# Patient Record
Sex: FEMALE | Race: WHITE | Employment: UNEMPLOYED | URBAN - METROPOLITAN AREA
[De-identification: names, ages, dates, MRNs, and addresses within clinical notes are randomized per-mention and may not be internally consistent; named-entity substitution may affect disease eponyms.]

---

## 2021-01-11 PROBLEM — O20.0 THREATENED ABORTION: Status: ACTIVE | Noted: 2021-01-11

## 2021-01-11 LAB — TYPE, ABO & RH, EXTERNAL: NORMAL

## 2021-01-20 PROBLEM — Z34.01 PRIMIGRAVIDA, FIRST TRIMESTER: Status: ACTIVE | Noted: 2021-01-20

## 2021-01-20 LAB
HBSAG, EXTERNAL: NEGATIVE
RUBELLA, EXTERNAL: NORMAL

## 2021-02-23 ENCOUNTER — APPOINTMENT (OUTPATIENT)
Dept: ULTRASOUND IMAGING | Age: 31
End: 2021-02-23
Attending: PHYSICIAN ASSISTANT
Payer: COMMERCIAL

## 2021-02-23 ENCOUNTER — HOSPITAL ENCOUNTER (EMERGENCY)
Age: 31
Discharge: HOME | End: 2021-02-23
Attending: EMERGENCY MEDICINE
Payer: COMMERCIAL

## 2021-02-23 VITALS
HEART RATE: 95 BPM | RESPIRATION RATE: 16 BRPM | DIASTOLIC BLOOD PRESSURE: 62 MMHG | OXYGEN SATURATION: 98 % | SYSTOLIC BLOOD PRESSURE: 125 MMHG | TEMPERATURE: 98.1 F

## 2021-02-23 DIAGNOSIS — B96.89 BV (BACTERIAL VAGINOSIS): ICD-10-CM

## 2021-02-23 DIAGNOSIS — O20.0 THREATENED MISCARRIAGE: ICD-10-CM

## 2021-02-23 DIAGNOSIS — N76.0 BV (BACTERIAL VAGINOSIS): ICD-10-CM

## 2021-02-23 DIAGNOSIS — O20.9 VAGINAL BLEEDING AFFECTING EARLY PREGNANCY: Primary | ICD-10-CM

## 2021-02-23 LAB
ALBUMIN SERPL-MCNC: 3.8 G/DL (ref 3.5–5)
ALBUMIN/GLOB SERPL: 1 {RATIO} (ref 1.2–3.5)
ALP SERPL-CCNC: 50 U/L (ref 50–136)
ALT SERPL-CCNC: 30 U/L (ref 12–65)
ANION GAP SERPL CALC-SCNC: 7 MMOL/L (ref 7–16)
AST SERPL-CCNC: 12 U/L (ref 15–37)
BASOPHILS # BLD: 0 K/UL (ref 0–0.2)
BASOPHILS NFR BLD: 0 % (ref 0–2)
BILIRUB SERPL-MCNC: 0.4 MG/DL (ref 0.2–1.1)
BUN SERPL-MCNC: 5 MG/DL (ref 6–23)
CALCIUM SERPL-MCNC: 9.5 MG/DL (ref 8.3–10.4)
CHLORIDE SERPL-SCNC: 105 MMOL/L (ref 98–107)
CO2 SERPL-SCNC: 24 MMOL/L (ref 21–32)
CREAT SERPL-MCNC: 0.6 MG/DL (ref 0.6–1)
DIFFERENTIAL METHOD BLD: ABNORMAL
EOSINOPHIL # BLD: 0.1 K/UL (ref 0–0.8)
EOSINOPHIL NFR BLD: 1 % (ref 0.5–7.8)
ERYTHROCYTE [DISTWIDTH] IN BLOOD BY AUTOMATED COUNT: 11.6 % (ref 11.9–14.6)
GLOBULIN SER CALC-MCNC: 3.7 G/DL (ref 2.3–3.5)
GLUCOSE SERPL-MCNC: 83 MG/DL (ref 65–100)
HCG SERPL-ACNC: ABNORMAL MIU/ML (ref 0–6)
HCT VFR BLD AUTO: 38.4 % (ref 35.8–46.3)
HGB BLD-MCNC: 13.4 G/DL (ref 11.7–15.4)
IMM GRANULOCYTES # BLD AUTO: 0 K/UL (ref 0–0.5)
IMM GRANULOCYTES NFR BLD AUTO: 0 % (ref 0–5)
LYMPHOCYTES # BLD: 2.2 K/UL (ref 0.5–4.6)
LYMPHOCYTES NFR BLD: 19 % (ref 13–44)
MCH RBC QN AUTO: 31.8 PG (ref 26.1–32.9)
MCHC RBC AUTO-ENTMCNC: 34.9 G/DL (ref 31.4–35)
MCV RBC AUTO: 91.2 FL (ref 79.6–97.8)
MONOCYTES # BLD: 0.7 K/UL (ref 0.1–1.3)
MONOCYTES NFR BLD: 6 % (ref 4–12)
NEUTS SEG # BLD: 9.1 K/UL (ref 1.7–8.2)
NEUTS SEG NFR BLD: 75 % (ref 43–78)
NRBC # BLD: 0 K/UL (ref 0–0.2)
PLATELET # BLD AUTO: 258 K/UL (ref 150–450)
PMV BLD AUTO: 10.9 FL (ref 9.4–12.3)
POTASSIUM SERPL-SCNC: 3.6 MMOL/L (ref 3.5–5.1)
PROT SERPL-MCNC: 7.5 G/DL (ref 6.3–8.2)
RBC # BLD AUTO: 4.21 M/UL (ref 4.05–5.2)
SERVICE CMNT-IMP: NORMAL
SODIUM SERPL-SCNC: 136 MMOL/L (ref 136–145)
WBC # BLD AUTO: 12.1 K/UL (ref 4.3–11.1)
WET PREP GENITAL: NORMAL

## 2021-02-23 PROCEDURE — 93975 VASCULAR STUDY: CPT

## 2021-02-23 PROCEDURE — 74011250636 HC RX REV CODE- 250/636: Performed by: EMERGENCY MEDICINE

## 2021-02-23 PROCEDURE — 80053 COMPREHEN METABOLIC PANEL: CPT

## 2021-02-23 PROCEDURE — 74011250637 HC RX REV CODE- 250/637: Performed by: EMERGENCY MEDICINE

## 2021-02-23 PROCEDURE — 87491 CHLMYD TRACH DNA AMP PROBE: CPT

## 2021-02-23 PROCEDURE — 99284 EMERGENCY DEPT VISIT MOD MDM: CPT

## 2021-02-23 PROCEDURE — 84702 CHORIONIC GONADOTROPIN TEST: CPT

## 2021-02-23 PROCEDURE — 87210 SMEAR WET MOUNT SALINE/INK: CPT

## 2021-02-23 PROCEDURE — 81003 URINALYSIS AUTO W/O SCOPE: CPT

## 2021-02-23 PROCEDURE — 74011000250 HC RX REV CODE- 250: Performed by: EMERGENCY MEDICINE

## 2021-02-23 PROCEDURE — 85025 COMPLETE CBC W/AUTO DIFF WBC: CPT

## 2021-02-23 PROCEDURE — 96372 THER/PROPH/DIAG INJ SC/IM: CPT

## 2021-02-23 RX ORDER — AZITHROMYCIN 250 MG/1
1000 TABLET, FILM COATED ORAL
Status: COMPLETED | OUTPATIENT
Start: 2021-02-23 | End: 2021-02-23

## 2021-02-23 RX ORDER — METRONIDAZOLE 500 MG/1
500 TABLET ORAL 2 TIMES DAILY
Qty: 14 TAB | Refills: 0 | Status: SHIPPED | OUTPATIENT
Start: 2021-02-23 | End: 2021-03-02

## 2021-02-23 RX ADMIN — AZITHROMYCIN MONOHYDRATE 1000 MG: 250 TABLET ORAL at 20:17

## 2021-02-23 RX ADMIN — LIDOCAINE HYDROCHLORIDE 500 MG: 10 INJECTION, SOLUTION INFILTRATION; PERINEURAL at 20:18

## 2021-02-23 NOTE — ED PROVIDER NOTES
Patient is here with vaginal bleeding that started today. She is 11 weeks pregnant today. This is her first pregnancy. She did have a normal ultrasound that showed an IUP last week at the OB/GYN's. She felt like she did have some cramping today. She did have some nausea but she has been having nausea and constipation with her pregnancy. No fever, chest pain, shortness of breath, upper abdominal pain, dizziness, weakness, dyspnea on exertion, orthopnea, swelling/tingling or weakness to arms or legs or other new symptoms. She did ambulate to the room without difficulty and is well-hydrated. Blood type is A+. The history is provided by the patient. Vaginal Bleeding  This is a new problem. The current episode started 3 to 5 hours ago. The problem has been resolved. Pertinent negatives include no chest pain, no abdominal pain, no headaches and no shortness of breath. Nothing aggravates the symptoms. Nothing relieves the symptoms. She has tried nothing for the symptoms. No past medical history on file.     Past Surgical History:   Procedure Laterality Date    HX TONSILLECTOMY           Family History:   Problem Relation Age of Onset    Breast Cancer Neg Hx     Ovarian Cancer Neg Hx     Uterine Cancer Neg Hx     Colon Cancer Neg Hx        Social History     Socioeconomic History    Marital status:      Spouse name: Not on file    Number of children: Not on file    Years of education: Not on file    Highest education level: Not on file   Occupational History    Not on file   Social Needs    Financial resource strain: Not on file    Food insecurity     Worry: Not on file     Inability: Not on file    Transportation needs     Medical: Not on file     Non-medical: Not on file   Tobacco Use    Smoking status: Never Smoker    Smokeless tobacco: Never Used   Substance and Sexual Activity    Alcohol use: Not Currently    Drug use: Never    Sexual activity: Yes     Partners: Male     Birth control/protection: None   Lifestyle    Physical activity     Days per week: Not on file     Minutes per session: Not on file    Stress: Not on file   Relationships    Social connections     Talks on phone: Not on file     Gets together: Not on file     Attends Orthodoxy service: Not on file     Active member of club or organization: Not on file     Attends meetings of clubs or organizations: Not on file     Relationship status: Not on file    Intimate partner violence     Fear of current or ex partner: Not on file     Emotionally abused: Not on file     Physically abused: Not on file     Forced sexual activity: Not on file   Other Topics Concern    Not on file   Social History Narrative    Abuse: Feels safe at home, no history of physical abuse, no history of sexual abuse         ALLERGIES: Patient has no known allergies. Review of Systems   Constitutional: Negative. HENT: Negative. Eyes: Negative. Respiratory: Negative. Negative for shortness of breath. Cardiovascular: Negative. Negative for chest pain. Gastrointestinal: Negative. Negative for abdominal pain. Genitourinary: Positive for vaginal bleeding. Musculoskeletal: Negative. Skin: Negative. Neurological: Negative. Negative for headaches. Psychiatric/Behavioral: Negative. All other systems reviewed and are negative. Vitals:    02/23/21 1729   BP: 135/75   Pulse: 97   Resp: 18   Temp: 98.1 °F (36.7 °C)   SpO2: 97%            Physical Exam  Vitals signs and nursing note reviewed. Exam conducted with a chaperone present. Constitutional:       Appearance: She is well-developed. HENT:      Head: Normocephalic and atraumatic. Right Ear: External ear normal.      Left Ear: External ear normal.      Nose: Nose normal.   Eyes:      Conjunctiva/sclera: Conjunctivae normal.      Pupils: Pupils are equal, round, and reactive to light. Neck:      Musculoskeletal: Normal range of motion and neck supple. Cardiovascular:      Rate and Rhythm: Normal rate and regular rhythm. Heart sounds: Normal heart sounds. Pulmonary:      Effort: Pulmonary effort is normal.      Breath sounds: Normal breath sounds. Abdominal:      General: Bowel sounds are normal.      Palpations: Abdomen is soft. Genitourinary:     Labia:         Right: No rash or tenderness. Left: No rash or tenderness. Vagina: Vaginal discharge present. Cervix: Discharge present. No cervical bleeding. Comments: Asif Bradshaw RN into assist with pelvic exam. The cervical os is thick and closed, streak of blood around cervix. Clear vaginal discharge  Musculoskeletal: Normal range of motion. Skin:     General: Skin is warm and dry. Neurological:      Mental Status: She is alert and oriented to person, place, and time. Deep Tendon Reflexes: Reflexes are normal and symmetric. Psychiatric:         Behavior: Behavior normal.         Thought Content: Thought content normal.         Judgment: Judgment normal.          MDM  Number of Diagnoses or Management Options     Amount and/or Complexity of Data Reviewed  Clinical lab tests: ordered  Tests in the radiology section of CPT®: ordered  Discuss the patient with other providers: yes (Dr. Jose Love )    Risk of Complications, Morbidity, and/or Mortality  Presenting problems: moderate  Diagnostic procedures: moderate  Management options: moderate    Patient Progress  Patient progress: stable         Procedures  6:42 PM spoke with Dr. Jose Love regarding patient. He will assume care of patient at shift change. We discussed the history, physical exam, and work up.

## 2021-02-23 NOTE — Clinical Note
[address] [address] EMERGENCY DEPT 
[address] [facility] 
[place] [address] [phone] Work/School Note Date: 2/23/2021 To Whom It May concern: 
 
Irma Murphy was seen and treated today in the emergency room by the following provider(s): 
Attending Provider: Alejandra Infante MD. Irma Murphy is excused from work/school on 02/23/21 and 02/24/21. She is medically clear to return to work/school on 2/25/2021. Sincerely, JESSE Wallace

## 2021-02-23 NOTE — ED TRIAGE NOTES
Pt states she is 11 weeks pregnant and noticed some vaginal bleeding when she wipes. This started today and is very light with minimal cramping.

## 2021-02-24 PROBLEM — O20.9 VAGINAL BLEEDING IN PREGNANCY, FIRST TRIMESTER: Status: ACTIVE | Noted: 2021-02-24

## 2021-02-24 NOTE — ED NOTES
I have reviewed discharge instructions with the patient. The patient verbalized understanding. Patient left ED via Discharge Method: ambulatory to Home with  family, self,). Opportunity for questions and clarification provided. Patient given 1 scripts. To continue your aftercare when you leave the hospital, you may receive an automated call from our care team to check in on how you are doing. This is a free service and part of our promise to provide the best care and service to meet your aftercare needs.  If you have questions, or wish to unsubscribe from this service please call [phone]. Thank you for Choosing our [employer] Emergency Department.

## 2021-02-24 NOTE — DISCHARGE INSTRUCTIONS
Pelvic rest, drink plenty of water, rest, call Dr. Hoa Chaudhry for a follow up appointment. Take Flagyl as directed.

## 2021-02-27 LAB
C TRACH RRNA SPEC QL NAA+PROBE: NEGATIVE
N GONORRHOEA RRNA SPEC QL NAA+PROBE: NEGATIVE
PLEASE NOTE:, 188601: NORMAL
SPECIMEN SOURCE: NORMAL

## 2021-03-24 PROBLEM — R30.0 DYSURIA: Status: ACTIVE | Noted: 2021-03-24

## 2021-03-24 PROBLEM — Z34.02 PRIMIGRAVIDA, SECOND TRIMESTER: Status: ACTIVE | Noted: 2021-01-20

## 2021-04-21 PROBLEM — O20.9 VAGINAL BLEEDING IN PREGNANCY, FIRST TRIMESTER: Status: RESOLVED | Noted: 2021-02-24 | Resolved: 2021-04-21

## 2021-04-21 PROBLEM — R30.0 DYSURIA: Status: RESOLVED | Noted: 2021-03-24 | Resolved: 2021-04-21

## 2021-04-21 PROBLEM — O20.0 THREATENED ABORTION: Status: RESOLVED | Noted: 2021-01-11 | Resolved: 2021-04-21

## 2021-04-29 PROBLEM — O36.8120 DECREASED FETUS MOVEMENTS AFFECTING MANAGEMENT OF MOTHER IN SECOND TRIMESTER: Status: ACTIVE | Noted: 2021-04-29

## 2021-05-21 PROBLEM — O36.8120 DECREASED FETUS MOVEMENTS AFFECTING MANAGEMENT OF MOTHER IN SECOND TRIMESTER: Status: RESOLVED | Noted: 2021-04-29 | Resolved: 2021-05-21

## 2021-07-12 PROBLEM — O26.853 SPOTTING AFFECTING PREGNANCY IN THIRD TRIMESTER: Status: ACTIVE | Noted: 2021-07-12

## 2021-07-12 PROBLEM — Z34.03 PRIMIGRAVIDA IN THIRD TRIMESTER: Status: ACTIVE | Noted: 2021-01-20

## 2021-08-17 LAB — GRBS, EXTERNAL: NEGATIVE

## 2021-09-14 ENCOUNTER — ANESTHESIA EVENT (OUTPATIENT)
Dept: LABOR AND DELIVERY | Age: 31
End: 2021-09-14
Payer: COMMERCIAL

## 2021-09-14 ENCOUNTER — HOSPITAL ENCOUNTER (INPATIENT)
Age: 31
LOS: 2 days | Discharge: HOME | End: 2021-09-16
Attending: OBSTETRICS & GYNECOLOGY | Admitting: OBSTETRICS & GYNECOLOGY
Payer: COMMERCIAL

## 2021-09-14 ENCOUNTER — ANESTHESIA (OUTPATIENT)
Dept: LABOR AND DELIVERY | Age: 31
End: 2021-09-14
Payer: COMMERCIAL

## 2021-09-14 PROBLEM — Z34.90 ENCOUNTER FOR INDUCTION OF LABOR: Status: ACTIVE | Noted: 2021-09-14

## 2021-09-14 LAB
ABO + RH BLD: NORMAL
BLOOD GROUP ANTIBODIES SERPL: NORMAL
ERYTHROCYTE [DISTWIDTH] IN BLOOD BY AUTOMATED COUNT: 13.7 % (ref 11.9–14.6)
HCT VFR BLD AUTO: 37.9 % (ref 35.8–46.3)
HGB BLD-MCNC: 12.9 G/DL (ref 11.7–15.4)
MCH RBC QN AUTO: 31.5 PG (ref 26.1–32.9)
MCHC RBC AUTO-ENTMCNC: 34 G/DL (ref 31.4–35)
MCV RBC AUTO: 92.4 FL (ref 79.6–97.8)
NRBC # BLD: 0 K/UL (ref 0–0.2)
PLATELET # BLD AUTO: 191 K/UL (ref 150–450)
PMV BLD AUTO: 12.6 FL (ref 9.4–12.3)
RBC # BLD AUTO: 4.1 M/UL (ref 4.05–5.2)
SPECIMEN EXP DATE BLD: NORMAL
WBC # BLD AUTO: 10.4 K/UL (ref 4.3–11.1)

## 2021-09-14 PROCEDURE — 85027 COMPLETE CBC AUTOMATED: CPT

## 2021-09-14 PROCEDURE — A4300 CATH IMPL VASC ACCESS PORTAL: HCPCS | Performed by: ANESTHESIOLOGY

## 2021-09-14 PROCEDURE — 77030003028 HC SUT VCRL J&J -A

## 2021-09-14 PROCEDURE — 0KQM0ZZ REPAIR PERINEUM MUSCLE, OPEN APPROACH: ICD-10-PCS | Performed by: OBSTETRICS & GYNECOLOGY

## 2021-09-14 PROCEDURE — 74011250637 HC RX REV CODE- 250/637: Performed by: OBSTETRICS & GYNECOLOGY

## 2021-09-14 PROCEDURE — 86901 BLOOD TYPING SEROLOGIC RH(D): CPT

## 2021-09-14 PROCEDURE — 77030018846 HC SOL IRR STRL H20 ICUM -A

## 2021-09-14 PROCEDURE — 77030014125 HC TY EPDRL BBMI -B: Performed by: ANESTHESIOLOGY

## 2021-09-14 PROCEDURE — 77030010848 HC CATH INTUTR PRSS KOLB -B

## 2021-09-14 PROCEDURE — 75410000003 HC RECOV DEL/VAG/CSECN EA 0.5 HR

## 2021-09-14 PROCEDURE — 10H07YZ INSERTION OF OTHER DEVICE INTO PRODUCTS OF CONCEPTION, VIA NATURAL OR ARTIFICIAL OPENING: ICD-10-PCS | Performed by: OBSTETRICS & GYNECOLOGY

## 2021-09-14 PROCEDURE — 0UQMXZZ REPAIR VULVA, EXTERNAL APPROACH: ICD-10-PCS | Performed by: OBSTETRICS & GYNECOLOGY

## 2021-09-14 PROCEDURE — 65270000029 HC RM PRIVATE

## 2021-09-14 PROCEDURE — 59400 OBSTETRICAL CARE: CPT | Performed by: OBSTETRICS & GYNECOLOGY

## 2021-09-14 PROCEDURE — 75410000000 HC DELIVERY VAGINAL/SINGLE

## 2021-09-14 PROCEDURE — 77010026065 HC OXYGEN MINIMUM MEDICAL AIR

## 2021-09-14 PROCEDURE — 77030005518 HC CATH URETH FOL 2W BARD -B

## 2021-09-14 PROCEDURE — 00HU33Z INSERTION OF INFUSION DEVICE INTO SPINAL CANAL, PERCUTANEOUS APPROACH: ICD-10-PCS | Performed by: ANESTHESIOLOGY

## 2021-09-14 PROCEDURE — 3E033VJ INTRODUCTION OF OTHER HORMONE INTO PERIPHERAL VEIN, PERCUTANEOUS APPROACH: ICD-10-PCS | Performed by: OBSTETRICS & GYNECOLOGY

## 2021-09-14 PROCEDURE — 10907ZC DRAINAGE OF AMNIOTIC FLUID, THERAPEUTIC FROM PRODUCTS OF CONCEPTION, VIA NATURAL OR ARTIFICIAL OPENING: ICD-10-PCS | Performed by: OBSTETRICS & GYNECOLOGY

## 2021-09-14 PROCEDURE — 74011250636 HC RX REV CODE- 250/636: Performed by: NURSE ANESTHETIST, CERTIFIED REGISTERED

## 2021-09-14 PROCEDURE — 76060000078 HC EPIDURAL ANESTHESIA

## 2021-09-14 PROCEDURE — 74011250636 HC RX REV CODE- 250/636: Performed by: OBSTETRICS & GYNECOLOGY

## 2021-09-14 PROCEDURE — 75410000002 HC LABOR FEE PER 1 HR

## 2021-09-14 RX ORDER — ROPIVACAINE HYDROCHLORIDE 5 MG/ML
INJECTION, SOLUTION EPIDURAL; INFILTRATION; PERINEURAL AS NEEDED
Status: DISCONTINUED | OUTPATIENT
Start: 2021-09-14 | End: 2021-09-14 | Stop reason: HOSPADM

## 2021-09-14 RX ORDER — IBUPROFEN 600 MG/1
600 TABLET ORAL EVERY 6 HOURS
Status: DISCONTINUED | OUTPATIENT
Start: 2021-09-14 | End: 2021-09-16 | Stop reason: HOSPADM

## 2021-09-14 RX ORDER — OXYTOCIN/RINGER'S LACTATE 30/500 ML
87.3 PLASTIC BAG, INJECTION (ML) INTRAVENOUS AS NEEDED
Status: COMPLETED | OUTPATIENT
Start: 2021-09-14 | End: 2021-09-15

## 2021-09-14 RX ORDER — DOCUSATE SODIUM 100 MG/1
100 CAPSULE, LIQUID FILLED ORAL
Status: DISCONTINUED | OUTPATIENT
Start: 2021-09-14 | End: 2021-09-16 | Stop reason: HOSPADM

## 2021-09-14 RX ORDER — ZOLPIDEM TARTRATE 5 MG/1
5 TABLET ORAL
Status: DISCONTINUED | OUTPATIENT
Start: 2021-09-14 | End: 2021-09-16 | Stop reason: HOSPADM

## 2021-09-14 RX ORDER — OXYTOCIN/RINGER'S LACTATE 30/500 ML
10 PLASTIC BAG, INJECTION (ML) INTRAVENOUS AS NEEDED
Status: COMPLETED | OUTPATIENT
Start: 2021-09-14 | End: 2021-09-14

## 2021-09-14 RX ORDER — SODIUM CHLORIDE 0.9 % (FLUSH) 0.9 %
5-40 SYRINGE (ML) INJECTION AS NEEDED
Status: DISCONTINUED | OUTPATIENT
Start: 2021-09-14 | End: 2021-09-16 | Stop reason: HOSPADM

## 2021-09-14 RX ORDER — DEXTROSE, SODIUM CHLORIDE, SODIUM LACTATE, POTASSIUM CHLORIDE, AND CALCIUM CHLORIDE 5; .6; .31; .03; .02 G/100ML; G/100ML; G/100ML; G/100ML; G/100ML
125 INJECTION, SOLUTION INTRAVENOUS CONTINUOUS
Status: DISCONTINUED | OUTPATIENT
Start: 2021-09-14 | End: 2021-09-16 | Stop reason: HOSPADM

## 2021-09-14 RX ORDER — LIDOCAINE HYDROCHLORIDE 20 MG/ML
JELLY TOPICAL
Status: DISCONTINUED | OUTPATIENT
Start: 2021-09-14 | End: 2021-09-14 | Stop reason: HOSPADM

## 2021-09-14 RX ORDER — SODIUM CHLORIDE 0.9 % (FLUSH) 0.9 %
5-40 SYRINGE (ML) INJECTION EVERY 8 HOURS
Status: DISCONTINUED | OUTPATIENT
Start: 2021-09-14 | End: 2021-09-16 | Stop reason: HOSPADM

## 2021-09-14 RX ORDER — OXYTOCIN/RINGER'S LACTATE 30/500 ML
0-20 PLASTIC BAG, INJECTION (ML) INTRAVENOUS
Status: DISCONTINUED | OUTPATIENT
Start: 2021-09-14 | End: 2021-09-16 | Stop reason: HOSPADM

## 2021-09-14 RX ORDER — LIDOCAINE HYDROCHLORIDE 10 MG/ML
1 INJECTION INFILTRATION; PERINEURAL
Status: DISCONTINUED | OUTPATIENT
Start: 2021-09-14 | End: 2021-09-14 | Stop reason: HOSPADM

## 2021-09-14 RX ORDER — DEXTROSE, SODIUM CHLORIDE, SODIUM LACTATE, POTASSIUM CHLORIDE, AND CALCIUM CHLORIDE 5; .6; .31; .03; .02 G/100ML; G/100ML; G/100ML; G/100ML; G/100ML
500 INJECTION, SOLUTION INTRAVENOUS
Status: COMPLETED | OUTPATIENT
Start: 2021-09-14 | End: 2021-09-14

## 2021-09-14 RX ORDER — DIPHENHYDRAMINE HCL 25 MG
25 CAPSULE ORAL
Status: DISCONTINUED | OUTPATIENT
Start: 2021-09-14 | End: 2021-09-16 | Stop reason: HOSPADM

## 2021-09-14 RX ORDER — BUTORPHANOL TARTRATE 2 MG/ML
1 INJECTION INTRAMUSCULAR; INTRAVENOUS
Status: DISCONTINUED | OUTPATIENT
Start: 2021-09-14 | End: 2021-09-14 | Stop reason: HOSPADM

## 2021-09-14 RX ORDER — ROPIVACAINE HYDROCHLORIDE 2 MG/ML
INJECTION, SOLUTION EPIDURAL; INFILTRATION; PERINEURAL
Status: DISCONTINUED | OUTPATIENT
Start: 2021-09-14 | End: 2021-09-14 | Stop reason: HOSPADM

## 2021-09-14 RX ORDER — SIMETHICONE 80 MG
80 TABLET,CHEWABLE ORAL
Status: DISCONTINUED | OUTPATIENT
Start: 2021-09-14 | End: 2021-09-16 | Stop reason: HOSPADM

## 2021-09-14 RX ORDER — MINERAL OIL
120 OIL (ML) ORAL
Status: COMPLETED | OUTPATIENT
Start: 2021-09-14 | End: 2021-09-14

## 2021-09-14 RX ORDER — OXYCODONE HYDROCHLORIDE 5 MG/1
5-10 TABLET ORAL
Status: DISCONTINUED | OUTPATIENT
Start: 2021-09-14 | End: 2021-09-16 | Stop reason: HOSPADM

## 2021-09-14 RX ORDER — HYDROCODONE BITARTRATE AND ACETAMINOPHEN 5; 325 MG/1; MG/1
1 TABLET ORAL
Status: DISCONTINUED | OUTPATIENT
Start: 2021-09-14 | End: 2021-09-16 | Stop reason: HOSPADM

## 2021-09-14 RX ORDER — PROMETHAZINE HYDROCHLORIDE 25 MG/1
25 TABLET ORAL
Status: DISCONTINUED | OUTPATIENT
Start: 2021-09-14 | End: 2021-09-16 | Stop reason: HOSPADM

## 2021-09-14 RX ORDER — OXYTOCIN/RINGER'S LACTATE 30/500 ML
0-20 PLASTIC BAG, INJECTION (ML) INTRAVENOUS
Status: DISCONTINUED | OUTPATIENT
Start: 2021-09-14 | End: 2021-09-14

## 2021-09-14 RX ADMIN — Medication 87.3 MILLI-UNITS/MIN: at 18:50

## 2021-09-14 RX ADMIN — SODIUM CHLORIDE, SODIUM LACTATE, POTASSIUM CHLORIDE, CALCIUM CHLORIDE, AND DEXTROSE MONOHYDRATE 500 ML: 600; 310; 30; 20; 5 INJECTION, SOLUTION INTRAVENOUS at 14:13

## 2021-09-14 RX ADMIN — Medication 2 MILLI-UNITS/MIN: at 07:44

## 2021-09-14 RX ADMIN — MINERAL OIL 120 ML: 1000 LIQUID ORAL at 18:51

## 2021-09-14 RX ADMIN — SODIUM CHLORIDE, SODIUM LACTATE, POTASSIUM CHLORIDE, CALCIUM CHLORIDE, AND DEXTROSE MONOHYDRATE 125 ML/HR: 600; 310; 30; 20; 5 INJECTION, SOLUTION INTRAVENOUS at 07:25

## 2021-09-14 RX ADMIN — ROPIVACAINE HYDROCHLORIDE 10 ML/HR: 2 INJECTION, SOLUTION EPIDURAL; INFILTRATION at 11:27

## 2021-09-14 RX ADMIN — Medication 10000 MILLI-UNITS: at 18:39

## 2021-09-14 RX ADMIN — ROPIVACAINE HYDROCHLORIDE 6 ML: 5 INJECTION, SOLUTION EPIDURAL; INFILTRATION; PERINEURAL at 11:27

## 2021-09-14 RX ADMIN — SODIUM CHLORIDE, SODIUM LACTATE, POTASSIUM CHLORIDE, AND CALCIUM CHLORIDE 500 ML: 600; 310; 30; 20 INJECTION, SOLUTION INTRAVENOUS at 10:38

## 2021-09-14 RX ADMIN — IBUPROFEN 600 MG: 600 TABLET ORAL at 22:03

## 2021-09-14 NOTE — PROGRESS NOTES
[address] [facility] OB/Gyn  [address], [place] [address], [address]  [address] [facility], [place], Tristen Sierra, [facility]    Labor Progress Note    Pt tolerating induction/labor well.     Vitals:    21 0734   BP: 124/81   Pulse: 72   Resp: 16   Weight: 212 lb (96.2 kg)   Height: 5' 11\" (1.803 m)       FHT's:  Baseline 's, reactive  [place]:  irreg ctx    SVE:  2-3/70/-2  Membranes:  AROM - clear    Assessement and Plan: Kristina Nicole 33 y.o.  at 40w0d admitted for term IOL    Continue pitocin augmentation     Pain control: none    GBS: neg      Rosalinda Solis MD  8:29 AM  21

## 2021-09-14 NOTE — PROGRESS NOTES
[address] [facility] OB/Gyn  [address] [facility], [address], [address]  [address] [facility], MD, Meño Childs, [facility]    Labor Progress Note    Pt tolerating labor well.     Vitals:    21 1401 21 1416 21 1431 21 1446   BP: 123/76 129/76 135/81 139/85   Pulse: 72 70 71 69   Resp:    16   Temp:    98.5 °F (36.9 °C)   Weight:       Height:           FHT's:  Baseline 's, reactive  [place]:  Unable to adequately trace ctx due to positioning    SVE:  /0  IUPC placed in usual fashion without difficulty    Assessement and Plan: Carter Pickens 33 y.o.  at 40w0d admitted for term IOL    Continue pitocin augmentation     Pain control: epidural    GBS: neg      Ayanna Rey MD  3:41 PM  21

## 2021-09-14 NOTE — L&D DELIVERY NOTE
Delivery Summary    Patient: Margaretmary Simmonds MRN: [id number]  SSN: [id number]    YOB: 1990  Age: 33 y.o. Sex: female       Information for the patient's :  Chung Smoke [[id number]]       Labor Events:    Labor: No    Steroids: None   Cervical Ripening Date/Time:       Cervical Ripening Type: None   Antibiotics During Labor: No   Rupture Identifier: Sac 1    Rupture Date/Time: 2021 8:26 AM   Rupture Type: AROM   Amniotic Fluid Volume: Moderate    Amniotic Fluid Description: Clear    Amniotic Fluid Odor:      Induction: Oxytocin       Induction Date/Time: 2021 8:10 AM    Indications for Induction: Post-term Gestation    Augmentation: Oxytocin   Augmentation Date/Time:      Indications for Augmentation: Ineffective Contraction Pattern   Labor complications: Additional complications:        Delivery Events:  Indications For Episiotomy:     Episiotomy:     Perineal Laceration(s):     Repaired:     Periurethral Laceration Location:      Repaired:     Labial Laceration Location:     Repaired:     Sulcal Laceration Location:     Repaired:     Vaginal Laceration Location:     Repaired:     Cervical Laceration Location:     Repaired:     Repair Suture:     Number of Repair Packets:     Estimated Blood Loss (ml):  ml   Quantitative Blood Loss (ml)                Delivery Date: 2021    Delivery Time: 6:36 PM  Delivery Type: Vaginal, Spontaneous  Sex:  Male    Gestational Age: 37w0d   Delivery Clinician:  Naty Mansfield  Living Status: Living   Delivery Location: L&D 430          APGARS  One minute Five minutes Ten minutes   Skin color: 1   1        Heart rate: 2   2        Grimace: 2   2        Muscle tone: 2   2        Breathin   2        Totals: 9   9            Presentation: Vertex    Position: Left Occiput Anterior  Resuscitation Method:  Suctioning-bulb; Tactile Stimulation     Meconium Stained: None      Cord Information: 3 Vessels  Complications: Nuchal Cord With Compressions  Cord around: head  Delayed cord clamping? Yes  Cord clamped date/time:   Disposition of Cord Blood: Lab    Blood Gases Sent?: No    Placenta:  Date/Time: 2021  6:39 PM  Removal: Spontaneous      Appearance: Normal     [place] Measurements:  Birth Weight: 6 lb 4 oz (2.835 kg)      Birth Length: 1' 7.69\" (0.5 m)      Head Circumference: 1' 1.58\" (0.345 m)      Chest Circumference: 1' 0.21\" (0.31 m)     Abdominal Girth: Other Providers:   TYLER Walls;KONG URBANO;JUSTIN AMEZQUITA;KINJAL DAMON;ARNAUD YEAGER;VENKATA JIMENEZ, Obstetrician;Primary Nurse;Charge Nurse;Scrub Tech;Scrub Tech;Primary  Nurse           Group B Strep:   Lab Results   Component Value Date/Time    GrTYRAtrep, External Negative 2021 12:00 AM     Information for the patient's :  Carlos A Francisco [[id number]]   No results found for: ABORH, PCTABR, PCTDIG, BILI, ABORHEXT, ABORH     No results for input(s): PCO2CB, PO2CB, HCO3I, SO2I, IBD, PTEMPI, SPECTI, PHICB, ISITE, IDEV, IALLEN in the last 72 hours.

## 2021-09-14 NOTE — PROGRESS NOTES
SVE c/c/+1.     1830- Pt pushing. 1832- Pt set up for delivery. Dr Cleopatra Kanner at bedside. 183- . Viable male. apgars 9/9. Wt 6-4.     [id number]- Placenta delivered. Pitocin infusing.

## 2021-09-14 NOTE — H&P
[facility]  [address] [place] [address], [place] [id number], [address]  [address] [facility], MD, Jag Tinoco DEIDRE-BC  Gavin Shafer [place] H&P      Assessment/Plan    Problem List  Date Reviewed: 2021        Codes Class    Encounter for induction of labor ICD-10-CM: Z34.90  ICD-9-CM: V22.1         Primigravida in third trimester ICD-10-CM: Z34.03  ICD-9-CM: V22.0     Overview Addendum 2021  8:54 AM by Zainab Dow MD     [facility] by LMP confirmed by 6 2/7 week US    2021: NIPT negx3, male, CF/SMA/Fragile X negative  2021: EFW 62%, AC 63%, MIKA nl, vertex  TDAP today, Plans breastfeeding, LARCs encouraged                   Subjective    Jenna Jett Allen 33 y.o.  40w0d  presented to L&D for term IOL. Pt has no complaints today. Pt denies vaginal bleeding/discharge. No LOF.  +FM.       OB History    Para Term  AB Living   1             SAB TAB Ectopic Molar Multiple Live Births                    # Outcome Date GA Lbr Wally/2nd Weight Sex Delivery Anes PTL Lv   1 Current                Past Medical History:   Diagnosis Date    H/O cold sores     no hx of genital lesions    Psychiatric problem     depression       Past Surgical History:   Procedure Laterality Date    HX TONSILLECTOMY         Family History   Problem Relation Age of Onset    Stroke Maternal Grandmother     Diabetes Maternal Grandfather     Breast Cancer Neg Hx     Ovarian Cancer Neg Hx     Uterine Cancer Neg Hx     Colon Cancer Neg Hx        Social History     Socioeconomic History    Marital status:      Spouse name: Not on file    Number of children: Not on file    Years of education: Not on file    Highest education level: Not on file   Occupational History    Not on file   Tobacco Use    Smoking status: Never Smoker    Smokeless tobacco: Never Used   Vaping Use    Vaping Use: Never used   Substance and Sexual Activity    Alcohol use: Not Currently    Drug use: Never  Sexual activity: Yes     Partners: Male     Birth control/protection: None   Other Topics Concern     Service Not Asked    Blood Transfusions Not Asked    Caffeine Concern Not Asked    Occupational Exposure Not Asked    Hobby Hazards Not Asked    Sleep Concern Not Asked    Stress Concern Not Asked    Weight Concern Not Asked    Special Diet Not Asked    Back Care Not Asked    Exercise Not Asked    Bike Helmet Not Asked    Seat Belt Not Asked    Self-Exams Not Asked   Social History Narrative    Abuse: Feels safe at home, no history of physical abuse, no history of sexual abuse     Social Determinants of Health     Financial Resource Strain:     Difficulty of Paying Living Expenses:    Food Insecurity:     Worried About Running Out of Food in the Last Year:     [address] in the Last Year:    Transportation Needs:     Lack of Transportation (Medical):      Lack of Transportation (Non-Medical):    Physical Activity:     Days of Exercise per Week:     Minutes of Exercise per Session:    Stress:     Feeling of Stress :    Social Connections:     Frequency of Communication with Friends and Family:     Frequency of Social Gatherings with Friends and Family:     Attends Buddhist Services:     Active Member of Clubs or Organizations:     Attends Club or Organization Meetings:     Marital Status:    Intimate Partner Violence:     Fear of Current or Ex-Partner:     Emotionally Abused:     Physically Abused:     Sexually Abused:        No Known Allergies      Review of Systems    Constitutional: No fevers or chills     Prenatal: + fetal movement, no VB/DC, no LOF     CV: No chest pain or palpatations    Resp: No SOB or cough    GI: No nausea/vomiting/diarrhea/constipation    Neuro: No HA, no seizure like activity    Skin: No rashes or lesions     Breast: No breast pain    : No dysuria or hematuria      Prenatal Record Review    The prenatal record and all pertinent labs has been reviewed.     Objective    Visit Vitals  /81 (BP 1 Location: Right upper arm, BP Patient Position: At rest)   Pulse 72   Resp 16   Ht 5' 11\" (1.803 m)   Wt 212 lb (96.2 kg)   LMP 12/08/2020   Breastfeeding Yes   BMI 29.57 kg/m²         Obstetric Exam    SVE: 2/70/-2      Physical Exam    Gen: alert and cooperative, NAD    HEENT: NCAT    CV: RRR    RESP: CTA bilat    ABD: Gravid, soft, NT    EXT: trace edema bilat    NEURO: No focal deficits    SKIN: No noted rashes or lesions       Nirmal Canseco MD  8:15 AM  09/14/21

## 2021-09-14 NOTE — PROGRESS NOTES
Lucía Plunkett at bedside at 22 [id number]. ANAI Gómez at bedside at 1118    Assisted pt to sitting up on bedside at 1122. Timeout completed at 18 with MD, ANAI and myself at bedside. Test dose given at 1130. Negative reaction. Dose given at see anesthesia  . Pt assisted to lying back in left  tilt position. See anesthesia record for details. See vital sign flow sheet for BP. Tolerated procedure well.

## 2021-09-14 NOTE — L&D DELIVERY NOTE
[place] [address], [address]  [phone]    Pedro Coello MD, Tristen Sierra, [facility]  Delivery Note    Present for entire delivery. Details in delivery summary. . Viable Male Infant, APGARS 9,9 .   Weight 4 lbs. , 6 oz. EBL:  200 mL  Pain mgmt:   Epidural    Nuchal cord x 1, loose, reduced on the perineum prior to delivery    Blood banking kit collected in usual fashion    Placenta spont, intact, 3VC. 2nd degree midline perineal and bilateral labial lacerations - all repaired with 3-0 vicryl rapide    Pt and infant stable.       Rosalinda Solis MD   7:10 PM  21

## 2021-09-14 NOTE — PROGRESS NOTES
SVE no change. Pt repositioned to side lie and release on left side for three contractions. 1715-Changed to left side on side lie and release for three contractions. 1725-Pt repositioned to throne position. Variable decels noted. SVE rim/100/0. Dr Mati Ornelas given update on pt status.

## 2021-09-14 NOTE — PROGRESS NOTES
36 FHT's with late decelerations into the 90/s with return to baseline. Pt turned to right hip tilt and pitocin cut in half to 7milliunits. , IVB started for 300cc bolus. 1251 Oxygen via facemask at 10 Liters and pitocin off  1254 Vag exam 4.5 cm  1320 oxygen removed and room air.  FHT's 140's

## 2021-09-15 PROCEDURE — 65270000029 HC RM PRIVATE

## 2021-09-15 PROCEDURE — 74011250637 HC RX REV CODE- 250/637: Performed by: OBSTETRICS & GYNECOLOGY

## 2021-09-15 RX ADMIN — IBUPROFEN 600 MG: 600 TABLET ORAL at 09:24

## 2021-09-15 RX ADMIN — IBUPROFEN 600 MG: 600 TABLET ORAL at 15:11

## 2021-09-15 RX ADMIN — DOCUSATE SODIUM 100 MG: 100 CAPSULE, LIQUID FILLED ORAL at 08:07

## 2021-09-15 RX ADMIN — Medication 1 SPRAY: at 03:41

## 2021-09-15 RX ADMIN — WITCH HAZEL 1 PAD: 500 SOLUTION RECTAL; TOPICAL at 03:41

## 2021-09-15 RX ADMIN — IBUPROFEN 600 MG: 600 TABLET ORAL at 03:41

## 2021-09-15 NOTE — PROGRESS NOTES
Admission assessment complete as noted. Patient oriented to room and unit. Plan of care reviewed and patient verbalizes understanding. Questions encouraged and answered. Patent encouraged to call for needs or concerns. Safety Teaching reviewed:   1. Hand hygiene prior to handling the infant. 2. Use of bulb syringe. 3. Bracelets with matching numbers are placed on mother and infant  4. An infant security tag  [facility]) is placed on the infant's ankle and monitored  5. All OB nurses wear pink Employee badges - do not give your baby to anyone without proper identification. 6. Never leave the baby alone in the room. 7. The infant should be placed on their back to sleep. on a firm mattress. No toys should be placed in the crib. (safe sleep video offered to view)  8. Never shake the baby (video offered to view)  9. Infant fall prevention - do not sleep with the baby, and place the baby in the crib while ambulating. 8. Mother and Baby Care booklet given to Mother.

## 2021-09-15 NOTE — PROGRESS NOTES
Shift assessment complete see flowsheet. Discussed today plan of care with pt. Bleeding precautions given. Encouraged pt to ambulate in hallway. No s/s of distress noted. Pt to call with needs/concerns. Pt in bed with call light in reach.

## 2021-09-15 NOTE — PROGRESS NOTES
Daily Progress note:    Patient is S/P vaginal delivery at term. No complaints today. Lochia < menses. No GI/ issues. No F/C.     VITALS  Patient Vitals for the past 24 hrs:   Temp Pulse Resp BP SpO2   09/15/21 0720 98 °F (36.7 °C) 66 17 111/69 97 %   09/15/21 0346 97.7 °F (36.5 °C) 71 16 123/69 97 %   09/15/21 0020 98.1 °F (36.7 °C) 71 18 134/72 97 %   09/14/21 2130 98.4 °F (36.9 °C) 71 16 125/80 98 %   09/14/21 2031  67  (!) 151/78    09/14/21 2016  81  134/82    09/14/21 2001  78  (!) 147/91    09/14/21 1946  75  (!) 145/89    09/14/21 1931 99 °F (37.2 °C) 88  (!) 140/75    09/14/21 1916  78  137/72    09/14/21 1907 99.1 °F (37.3 °C) 76  133/83    09/14/21 1846  (!) 162  (!) 152/88    09/14/21 1832  (!) 103  (!) 163/96    09/14/21 1817  90  137/85    09/14/21 1801  81  (!) 149/67    09/14/21 1747  73  (!) 157/75    09/14/21 1731 98.6 °F (37 °C) 71  (!) 141/91    09/14/21 1716  67  (!) 142/92    09/14/21 1702  67  123/73    09/14/21 1646  79  (!) 138/95    09/14/21 1631  82  (!) 133/95    09/14/21 1616  69  128/81    09/14/21 1601  71  133/84    09/14/21 1547  71  130/70    09/14/21 1531  69  137/85    09/14/21 1516  70  132/83    09/14/21 1502  71  130/81    09/14/21 1446 98.5 °F (36.9 °C) 69 16 139/85    09/14/21 1431  71  135/81    09/14/21 1416  70  129/76    09/14/21 1401  72  123/76    09/14/21 1346  64  121/73    09/14/21 1331  60  125/69    09/14/21 1316  65  126/68    09/14/21 1302  (!) 59  120/62    09/14/21 1244  61  116/73    09/14/21 1239  64  118/72    09/14/21 1235 98 °F (36.7 °C) 60 16 119/70    09/14/21 1230  62  120/68    09/14/21 1224  62  117/72    09/14/21 1219  65  118/72    09/14/21 1214  67  127/71    09/14/21 1211  81  (!) 113/58    09/14/21 1204  64  130/74    09/14/21 1200  75  120/75    09/14/21 1154  63  125/72    09/14/21 1150  67  121/66    09/14/21 1144  72  107/68    21 1138  76  130/74    21 1137  72  119/71    21 1135  66  130/75    21 1131  72  (!) 135/91    21 1130  65  (!) 139/91    21 1116  62  (!) 148/90    21 1046  62  (!) 151/93    21 1016  (!) 57  134/87    21 0945  68  116/74         CV - RRR  LUNGS - CTA bilaterally  ABD - soft, approp tend, FF below umbilicus  EXT - tr edema bilaterally          Labs:  No results found for this or any previous visit (from the past 24 hour(s)). PPD #1      Pt is breast feeding. No issues or complaints today. Stable. Routine PP. Elevated blood pressures during labor, but these have normalized postpartum with no hx antepartum. If she has additional PP will rule in for [facility]. Will check labs and start lasix. Otherwise continue to monitor.      [address] Kristi Cohn DO  9:33 AM  09/15/21

## 2021-09-15 NOTE — PROGRESS NOTES
Chart reviewed - first time parent; depression. SW met with patient while social distancing w/appropriate PPE. Patient reports a history of depression, but she denies any concerns with this presently.  provided education on [facility] Postpartum [place] Home Visit Program.  Family was undecided on need for home visit. No referral will be made at this time. Family has this 's contact information should they decide to participate in program.    Patient given informational packet on  mood & anxiety disorders (resources/education). Family denies any additional needs from  at this time. Family has 's contact information should any needs/questions arise.     LINNEA Garcia  [place]   [phone]

## 2021-09-15 NOTE — ANESTHESIA PREPROCEDURE EVALUATION
Relevant Problems   No relevant active problems       Anesthetic History   No history of anesthetic complications            Review of Systems / Medical History  Patient summary reviewed and pertinent labs reviewed    Pulmonary  Within defined limits                 Neuro/Psych   Within defined limits           Cardiovascular                  Exercise tolerance: >4 METS     GI/Hepatic/Renal  Within defined limits              Endo/Other  Within defined limits           Other Findings              Physical Exam    Airway  Mallampati: II  TM Distance: 4 - 6 cm         Cardiovascular    Rhythm: regular  Rate: normal      Pertinent negatives: No murmur   Dental  No notable dental hx       Pulmonary  Breath sounds clear to auscultation               Abdominal         Other Findings            Anesthetic Plan    ASA: 1  Anesthesia type: epidural          Induction: Intravenous  Anesthetic plan and risks discussed with: Patient

## 2021-09-15 NOTE — PROGRESS NOTES
SBAR IN Report: Mother    Verbal report received from Angélica Hardwick RN (full name & credentials) on this patient, who is now being transferred from L&D (unit) for routine progression of care. The patient is not wearing a green \"Anesthesia-Duramorph\" band. Report consisted of patient's Situation, Background, Assessment and Recommendations (SBAR). [place] ID bands were compared with the identification form, and verified with the patient and transferring nurse. Information from the SBAR and Intake/Output and the [place] Report was reviewed with the transferring nurse; opportunity for questions and clarification provided.

## 2021-09-15 NOTE — LACTATION NOTE

## 2021-09-15 NOTE — PROGRESS NOTES
Report received from Salvatore Albarran RN care assumed. Pt in bed with call light in reach. No complaints at this time.

## 2021-09-15 NOTE — PROGRESS NOTES
Patient up to bathroom with RN assistance. Velia-care taught and completed. Questions encouraged and answered. Patient ambulating without difficulty, encouraged to call for needs or concerns. Verbalizes understanding.

## 2021-09-15 NOTE — ANESTHESIA PROCEDURE NOTES
Epidural Block    Patient location during procedure: OB  Start time: 9/14/2021 11:21 AM  End time: 9/14/2021 11:27 AM  Reason for block: labor epidural  Staffing  Performed: attending   Anesthesiologist: Wolfgang Self MD  Resident/CRNA: Becky Cisneros CRNA  Preanesthetic Checklist  Completed: patient identified, risks and benefits discussed, surgical consent, pre-op evaluation and timeout performed  Block Placement  Patient position: sitting  Prep: ChloraPrep  Sterility prep: cap, drape, gloves, hand and mask  Sedation level: no sedation  Patient monitoring: continuous pulse oximetry and heart rate  Approach: midline  Location: lumbar  Lumbar location: L3-L4  Epidural  Loss of resistance technique: saline  Guidance: landmark technique  Needle  Needle type: Tuohy   Needle gauge: 17 G  Needle length: 10 cm  Needle insertion depth: 5 cm  Catheter type: end hole  Catheter size: 19 G  Catheter at skin depth: 10 cm  Catheter securement method: clear occlusive dressing, liquid medical adhesive and surgical tape  Test dose: negative  Assessment  Number of attempts: 1  Procedure assessment: patient tolerated procedure well with no immediate complications  Additional Notes  Discussed the risks and benefits of epidural placement and use with patient including (but not limited to) the risk of wet tap and spinal headache, inadequate analgesia, need for removal/replacement of epidural, and hypotension. Discussed the remote risk of uncontrolled bleeding or infection requiring an operation to remedy. After the patient agreed to proceed, I ensured the patient had no contraindications to labor epidural placement including prohibitive heart defects, hypocoagulation, family or personal history of a bleeding disorder. Epidural placement is described in the block note. Frequent hemodynamic monitoring in the first 30 minutes following initial placement was performed.   Patient and RN were instructed to call anytime with any questions.

## 2021-09-15 NOTE — ANESTHESIA POSTPROCEDURE EVALUATION
* No procedures listed *. No value filed. Anesthesia Post Evaluation      Multimodal analgesia: multimodal analgesia used between 6 hours prior to anesthesia start to PACU discharge  Patient location during evaluation: bedside  Patient participation: complete - patient participated  Level of consciousness: awake and responsive to light touch  Pain management: adequate  Airway patency: patent  Anesthetic complications: no  Cardiovascular status: acceptable, hemodynamically stable, blood pressure returned to baseline and stable  Respiratory status: acceptable, unassisted, spontaneous ventilation and nonlabored ventilation  Hydration status: acceptable        INITIAL Post-op Vital signs: No vitals data found for the desired time range.

## 2021-09-15 NOTE — LACTATION NOTE
This note was copied from a baby's chart. In to see mom and infant for first time today. First baby. Has not stayed latch since birth despite several attempts. Meeting parents back today to do observation together. Showed mom hand expression. Baby has tight suck on finger, lactation did suck practice and gum massage w/ baby prior to bring to mom to help relax jaw. Got baby skin to skin w/ mom on her left breast. Assisted mom in showing her how to get him on deep and wide. Took several attempts for baby to stay latched and start sucking consistently well. Once got going, nursed well for 15 minutes. Good nutritive tugging noted. Showed parents what to look for. Tiny flattened edge to nipple when baby came off after feeding, so reviewed to get even deeper next time, mom had no complaints of pain during feeding- strong tug. Burped infant and assisted mom w/ her trying to get baby to feed on other breast. Tried for 5 minutes but baby just held breast and mouth and falling asleep, no longer interested. Reviewed 1st and 2nd 24 hr feeding/output expectations, normalcy of periods of cluster feeding. Mom excited as this is baby's first real feed since birth. Encouraged her to be proactive w/ bringing baby to breast q2-3 hr but if by 24 hrs baby not feeding consistently well, to pump and give back any expressed colostrum. Parents comfortable w/ plan.  Will follow up in am.

## 2021-09-16 VITALS
RESPIRATION RATE: 17 BRPM | HEIGHT: 71 IN | WEIGHT: 212 LBS | HEART RATE: 77 BPM | OXYGEN SATURATION: 95 % | SYSTOLIC BLOOD PRESSURE: 126 MMHG | TEMPERATURE: 97.9 F | DIASTOLIC BLOOD PRESSURE: 77 MMHG | BODY MASS INDEX: 29.68 KG/M2

## 2021-09-16 PROCEDURE — 74011250637 HC RX REV CODE- 250/637: Performed by: OBSTETRICS & GYNECOLOGY

## 2021-09-16 RX ORDER — IBUPROFEN 800 MG/1
800 TABLET ORAL
Qty: 60 TABLET | Refills: 2 | Status: SHIPPED | OUTPATIENT
Start: 2021-09-16

## 2021-09-16 RX ADMIN — IBUPROFEN 600 MG: 600 TABLET ORAL at 07:44

## 2021-09-16 RX ADMIN — DOCUSATE SODIUM 100 MG: 100 CAPSULE, LIQUID FILLED ORAL at 07:44

## 2021-09-16 NOTE — PROGRESS NOTES
Shift assessment complete see flowsheet. Discussed today plan of care with pt. Bleeding precautions given. Questions encouraged and answered. Pt to call with needs/concerns. No s/s of distress noted.    EPDS 3

## 2021-09-16 NOTE — DISCHARGE SUMMARY
Post-Partum Day Number 2 Progress/Discharge Note    Patient doing well post-partum without significant complaint. Voiding without difficulty, normal lochia. Vitals:  Patient Vitals for the past 8 hrs:   BP Temp Pulse Resp SpO2   21 0718 126/77 97.9 °F (36.6 °C) 77 17 95 %     Temp (24hrs), Av.9 °F (36.6 °C), Min:97.7 °F (36.5 °C), Max:98 °F (36.7 °C)      Vital signs stable, afebrile. Exam:  Patient without distress. Abdomen soft, fundus firm at level of umbilicus               Lower extremities are negative for swelling, cords or tenderness. Lab/Data Review: All lab results for the last 24 hours reviewed. Assessment and Plan:  Patient appears to be having uncomplicated post-partum course. Continue routine perineal care and maternal education. Plan discharge for today with follow up in our office in 6 weeks. Will have pt get RN appt for BP check in 1-2 wks.

## 2021-09-16 NOTE — PROGRESS NOTES
Report received from Meng He RN care assumed. Pt in bed with call light in reach. No complaints at this time. Call light within reach.

## 2021-09-16 NOTE — DISCHARGE INSTRUCTIONS
Patient Education        After Your Delivery (the Postpartum Period): Care Instructions  Your Care Instructions     Congratulations on the birth of your baby. Like pregnancy, the  period can be a time of excitement, caitlin, and exhaustion. You may look at your wondrous little baby and feel happy. You may also be overwhelmed by your new sleep hours and new responsibilities. At first, babies often sleep during the days and are awake at night. They do not have a pattern or routine. They may make sudden gasps, jerk themselves awake, or look like they have crossed eyes. These are all normal, and they may even make you smile. In these first weeks after delivery, try to take good care of yourself. It may take 4 to 6 weeks to feel like yourself again, and possibly longer if you had a  birth. You will likely feel very tired for several weeks. Your days will be full of ups and downs, but lots of caitlin as well. Follow-up care is a key part of your treatment and safety. Be sure to make and go to all appointments, and call your doctor if you are having problems. It's also a good idea to know your test results and keep a list of the medicines you take. How can you care for yourself at home? Take care of your body after delivery  · Use pads instead of tampons for the bloody flow that may last as long as 2 weeks. · Ease cramps with ibuprofen (Advil, Motrin). · Ease soreness of hemorrhoids and the area between your vagina and rectum with ice compresses or witch hazel pads. · Ease constipation by drinking lots of fluid and eating high-fiber foods. Ask your doctor about over-the-counter stool softeners. · Cleanse yourself with a gentle squeeze of warm water from a bottle instead of wiping with toilet paper. · Take a sitz bath in warm water several times a day. · Wear a good nursing bra. Ease sore and swollen breasts with warm, wet washcloths.   · If you aren't breastfeeding, use ice rather than heat for breast soreness. · Your period may not start for several months if you are breastfeeding. You may bleed more, and longer at first, than you did before you got pregnant. · Wait until you are healed (about 4 to 6 weeks) before you have sex. Ask your doctor when it is okay for you to have sex. · Try not to travel with your baby for 5 or 6 weeks. If you take a long car trip, make frequent stops to walk around and stretch. Pelvic rest for 6wk  NO driving for 2wk or while taking pain medication  NO tub baths, pools, or hot tubs for 6wk   NO lifting >10lb for 2wk   Avoid exhaustion  · Rest every day. Try to nap when your baby naps. · Ask another adult to be with you for a few days after delivery. · Plan for  if you have other children. · Stay flexible so you can eat at odd hours and sleep when you need to. Both you and your baby are making new schedules. · Plan small trips to get out of the house. Change can make you feel less tired. · Ask for help with housework, cooking, and shopping. Remind yourself that your job is to care for your baby. Know about help for postpartum depression  · \"Baby blues\" are common for the first 1 to 2 weeks after birth. You may cry or feel sad or irritable for no reason. · Rest whenever you can. Being tired makes it harder to handle your emotions. · Go for walks with your baby. · Talk to your partner, friends, and family about your feelings. · If your symptoms last for more than a few weeks, or if you feel very depressed, ask your doctor for help. · Postpartum depression can be treated. Support groups and counseling can help. Sometimes medicine can also help. Stay healthy  · Eat healthy foods so you have more energy. · If you breastfeed, avoid drugs. If you quit smoking during pregnancy, try to stay smoke-free. If you choose to have a drink now and then, have only one drink, and limit the number of occasions that you have a drink.  Wait to breastfeed at least 2 hours after you have a drink to reduce the amount of alcohol the baby may get in the milk. · Start daily exercise after 4 to 6 weeks, but rest when you feel tired. · Learn exercises to tone your belly. Do Kegel exercises to regain strength in your pelvic muscles. You can do these exercises while you stand or sit. ? Squeeze the same muscles you would use to stop your urine. Your belly and thighs should not move. ? Hold the squeeze for 3 seconds, and then relax for 3 seconds. ? Start with 3 seconds. Then add 1 second each week until you are able to squeeze for 10 seconds. ? Repeat the exercise 10 to 15 times for each session. Do three or more sessions each day. · Find a class for you and your baby that has an exercise time. · If you had a  birth, give yourself a bit more time before you exercise, and be careful. When should you call for help? Call 911  anytime you think you may need emergency care. For example, call if:    · You have thoughts of harming yourself, your baby, or another person.     · You passed out (lost consciousness).     · You have chest pain, are short of breath, or cough up blood.     · You have a seizure. Call your doctor now or seek immediate medical care if:    · You have severe vaginal bleeding. This means you are passing blood clots and soaking through a pad each hour for 2 or more hours.     · You are dizzy or lightheaded, or you feel like you may faint.     · You have a fever.     · You have new or more belly pain.     · You have signs of a blood clot in your leg (called a deep vein thrombosis), such as:  ? Pain in the calf, back of the knee, thigh, or groin. ? Redness and swelling in your leg or groin.     · You have signs of preeclampsia, such as:  ? Sudden swelling of your face, hands, or feet. ? New vision problems (such as dimness, blurring, or seeing spots). ? A severe headache.    Watch closely for changes in your health, and be sure to contact your doctor if:    · Your vaginal bleeding seems to be getting heavier.     · You have new or worse vaginal discharge.     · You feel sad, anxious, or hopeless for more than a few days.     · You do not get better as expected. Where can you learn more? Go to http://www.serrato.com/  Enter A461 in the search box to learn more about \"After Your Delivery (the Postpartum Period): Care Instructions. \"  Current as of: June 16, 2021               Content Version: 13.0  © 2006-2021 [employer]. Care instructions adapted under license by [employer] (which disclaims liability or warranty for this information). If you have questions about a medical condition or this instruction, always ask your healthcare professional. [address] any warranty or liability for your use of this information.

## 2021-09-16 NOTE — LACTATION NOTE
This note was copied from a baby's chart. In to follow up with mom and infant prior to discharge to home. Mom stated that infant has continued to latch and nurse well. She stated that infant cluster fed during the night but now has been \"sleepy\". infant was out of the room for a circumcision. Informed mom that he may be sleepy upon returning for a few hours. Informed mom and dad this is normal and he could cluster feed later today. Reviewed discharge information as well and answered questions. Mom and infant are following up with [facility] and will see lactation consultant there.

## 2021-09-16 NOTE — PROGRESS NOTES
Patient discharged to home per Dr. Satya Beck orders. Discharge instructions reviewed with patient and pt give a copy. Questions encouraged and answered. Patient verbalizes understanding. Patient escorted by [facility] staff Owen Mcelroy RN) to private vehicle. Pt declined w/c for d/c. Stable at discharge.

## 2021-10-26 PROBLEM — Z34.90 ENCOUNTER FOR INDUCTION OF LABOR: Status: RESOLVED | Noted: 2021-09-14 | Resolved: 2021-10-26

## 2021-10-26 PROBLEM — Z34.03 PRIMIGRAVIDA IN THIRD TRIMESTER: Status: RESOLVED | Noted: 2021-01-20 | Resolved: 2021-10-26

## 2024-07-22 ENCOUNTER — TELEPHONE (OUTPATIENT)
Dept: OBGYN CLINIC | Age: 34
End: 2024-07-22

## 2024-07-22 ENCOUNTER — LAB (OUTPATIENT)
Dept: OBGYN CLINIC | Age: 34
End: 2024-07-22

## 2024-07-22 DIAGNOSIS — O20.9 VAGINAL BLEEDING IN PREGNANCY, FIRST TRIMESTER: ICD-10-CM

## 2024-07-22 DIAGNOSIS — O20.9 VAGINAL BLEEDING IN PREGNANCY, FIRST TRIMESTER: Primary | ICD-10-CM

## 2024-07-22 LAB — HCG SERPL-ACNC: NORMAL MIU/ML

## 2024-07-22 NOTE — TELEPHONE ENCOUNTER
Called patient back regarding her concern of VB that started this past Friday on 7/19/2024 that was heavy over the weekend but slowed down today. Patient only notices bleeding when she wipes when using the restroom.     Patient states she is experiencing a M/C and is wondering what she needs to do next.    Reviewed with provider-provider stated schedule bhcg blood draw for patient.   Updated patient on instructions, patient scheduled for 7/22/2024 at 3:45pm for bhcg lab appointment.     Patient has NOB appointment on 8/5/2024, will keep appointment until we received bhcg

## 2024-07-23 ENCOUNTER — TELEPHONE (OUTPATIENT)
Dept: OBGYN CLINIC | Age: 34
End: 2024-07-23

## 2024-07-23 DIAGNOSIS — O20.0 THREATENED ABORTION: Primary | ICD-10-CM

## 2024-07-23 NOTE — TELEPHONE ENCOUNTER
Pt lvm stating she would like to discuss her miscarriage blood work because she is confused by the results. Called pt back, informed pt that I will need to send a message to the provider to see what next steps need to be done. Pt asked if she would hear back by end of today. Informed pt I am leaving early today but I will let other staff know, but I will contact her tomorrow if they have not. Pt voiced understanding.

## 2024-07-23 NOTE — TELEPHONE ENCOUNTER
This is only one measurement.  We need to repeat the quant with prog tomorrow afternoon.  I have ordered these labs. This will let us know if her levels have gone up, down or plateau.  We will let her know when those results have come back

## 2024-07-24 ENCOUNTER — LAB (OUTPATIENT)
Dept: OBGYN CLINIC | Age: 34
End: 2024-07-24

## 2024-07-24 DIAGNOSIS — O20.0 THREATENED ABORTION: ICD-10-CM

## 2024-07-24 LAB
HCG SERPL-ACNC: NORMAL MIU/ML
PROGEST SERPL-MCNC: 13 NG/ML

## 2024-07-24 NOTE — TELEPHONE ENCOUNTER
Called pt, message below reviewed with pt, pt voiced understanding and scheduled lab visit for 07/24@1:15pm

## 2024-07-25 ENCOUNTER — TELEPHONE (OUTPATIENT)
Dept: OBGYN CLINIC | Age: 34
End: 2024-07-25

## 2024-07-25 ENCOUNTER — PROCEDURE VISIT (OUTPATIENT)
Dept: OBGYN CLINIC | Age: 34
End: 2024-07-25
Payer: COMMERCIAL

## 2024-07-25 ENCOUNTER — ROUTINE PRENATAL (OUTPATIENT)
Dept: OBGYN CLINIC | Age: 34
End: 2024-07-25

## 2024-07-25 VITALS
DIASTOLIC BLOOD PRESSURE: 62 MMHG | WEIGHT: 178 LBS | BODY MASS INDEX: 24.92 KG/M2 | SYSTOLIC BLOOD PRESSURE: 116 MMHG | HEIGHT: 71 IN

## 2024-07-25 DIAGNOSIS — O09.521 MULTIGRAVIDA OF ADVANCED MATERNAL AGE IN FIRST TRIMESTER: ICD-10-CM

## 2024-07-25 DIAGNOSIS — O20.9 VAGINAL BLEEDING IN PREGNANCY, FIRST TRIMESTER: Primary | ICD-10-CM

## 2024-07-25 DIAGNOSIS — O20.8 SUBCHORIONIC HEMORRHAGE OF PLACENTA IN FIRST TRIMESTER: ICD-10-CM

## 2024-07-25 DIAGNOSIS — O09.521 MULTIGRAVIDA OF ADVANCED MATERNAL AGE IN FIRST TRIMESTER: Primary | ICD-10-CM

## 2024-07-25 DIAGNOSIS — O99.321 DRUG USE AFFECTING PREGNANCY IN FIRST TRIMESTER: ICD-10-CM

## 2024-07-25 DIAGNOSIS — O36.80X0 ENCOUNTER TO DETERMINE FETAL VIABILITY OF PREGNANCY, SINGLE OR UNSPECIFIED FETUS: ICD-10-CM

## 2024-07-25 LAB
ABO + RH BLD: NORMAL
AMPHET UR QL SCN: NEGATIVE
BARBITURATES UR QL SCN: NEGATIVE
BASOPHILS # BLD: 0 K/UL (ref 0–0.2)
BASOPHILS NFR BLD: 0 % (ref 0–2)
BENZODIAZ UR QL: NEGATIVE
BLOOD GROUP ANTIBODIES SERPL: NORMAL
CANNABINOIDS UR QL SCN: NEGATIVE
COCAINE UR QL SCN: NEGATIVE
DIFFERENTIAL METHOD BLD: NORMAL
EOSINOPHIL # BLD: 0.1 K/UL (ref 0–0.8)
EOSINOPHIL NFR BLD: 1 % (ref 0.5–7.8)
ERYTHROCYTE [DISTWIDTH] IN BLOOD BY AUTOMATED COUNT: 13.1 % (ref 11.9–14.6)
EST. AVERAGE GLUCOSE BLD GHB EST-MCNC: 105 MG/DL
HBA1C MFR BLD: 5.3 % (ref 0–5.6)
HBV SURFACE AG SER QL: NONREACTIVE
HCT VFR BLD AUTO: 45.1 % (ref 35.8–46.3)
HCV AB SER QL: NONREACTIVE
HGB BLD-MCNC: 14.4 G/DL (ref 11.7–15.4)
HIV 1+2 AB+HIV1 P24 AG SERPL QL IA: NONREACTIVE
HIV 1/2 RESULT COMMENT: NORMAL
IMM GRANULOCYTES # BLD AUTO: 0 K/UL (ref 0–0.5)
IMM GRANULOCYTES NFR BLD AUTO: 0 % (ref 0–5)
LYMPHOCYTES # BLD: 2.6 K/UL (ref 0.5–4.6)
LYMPHOCYTES NFR BLD: 24 % (ref 13–44)
MCH RBC QN AUTO: 30.5 PG (ref 26.1–32.9)
MCHC RBC AUTO-ENTMCNC: 31.9 G/DL (ref 31.4–35)
MCV RBC AUTO: 95.6 FL (ref 82–102)
METHADONE UR QL: NEGATIVE
MONOCYTES # BLD: 0.6 K/UL (ref 0.1–1.3)
MONOCYTES NFR BLD: 6 % (ref 4–12)
NEUTS SEG # BLD: 7.3 K/UL (ref 1.7–8.2)
NEUTS SEG NFR BLD: 69 % (ref 43–78)
NRBC # BLD: 0 K/UL (ref 0–0.2)
OPIATES UR QL: NEGATIVE
PCP UR QL: NEGATIVE
PLATELET # BLD AUTO: 267 K/UL (ref 150–450)
PMV BLD AUTO: 11.2 FL (ref 9.4–12.3)
RBC # BLD AUTO: 4.72 M/UL (ref 4.05–5.2)
RUBV IGG SERPL IA-ACNC: 216 IU/ML
T PALLIDUM AB SER QL IA: NONREACTIVE
WBC # BLD AUTO: 10.7 K/UL (ref 4.3–11.1)

## 2024-07-25 PROCEDURE — 0500F INITIAL PRENATAL CARE VISIT: CPT | Performed by: OBSTETRICS & GYNECOLOGY

## 2024-07-25 PROCEDURE — 76817 TRANSVAGINAL US OBSTETRIC: CPT | Performed by: OBSTETRICS & GYNECOLOGY

## 2024-07-25 RX ORDER — ONDANSETRON 4 MG/1
4 TABLET, ORALLY DISINTEGRATING ORAL 3 TIMES DAILY PRN
Qty: 28 TABLET | Refills: 1 | Status: SHIPPED | OUTPATIENT
Start: 2024-07-25

## 2024-07-25 NOTE — TELEPHONE ENCOUNTER
----- Message from Tristen Schaffer MD sent at 7/25/2024  7:49 AM EDT -----  Please let pt know that her prog is within normal range and her quant has risen appropriately.  She can come to her regularly scheduled new OB visit.

## 2024-07-25 NOTE — PROGRESS NOTES
Patient comes in today for initial prenatal visit. No complaints/concerns today.    Fetal Movements:  No  Contractions:  No  Vaginal Bleeding:  Yes-per phone note today patient still has VB today.   Leaking Fluid:  No  GI/ issues:  Yes-nausea, patient is taking unisom.     Drug/Alcohol 4P's Plus Screening    1.  Have either of your parents ever had a problem with drugs/alcohol/prescription drugs? Yes  2.  Does your partner have a problem with drugs/alcohol/prescription drugs?  No  3.  In the past, have you ever had a problem with drugs/alcohol/prescription drugs?  No  4.  Before you were pregnant, in the past month, have you done any drugs, drank any alcohol or abused any prescription drugs?    Yes  If \"YES\" to any of the above, please give further details:  Patient states her father has hx of abusing pain pills and excessive alcohol use.   Patient has recently used Kratom.     LAST PAP:  1/20/2021, neg., HPV neg.     LAST MAMMO:  never     LMP:  Patient's last menstrual period was 06/09/2024.    FAMILY HISTORY OF:   Breast Cancer:  No   Ovarian Cancer:  No   Uterine Cancer:  No   Colon Cancer:  No    Vitals:    07/25/24 1451   BP: 116/62   Site: Right Upper Arm   Position: Sitting   Weight: 80.7 kg (178 lb)   Height: 1.803 m (5' 11\")        JOSE DAVID SOSA RN  07/25/24  3:03 PM

## 2024-07-25 NOTE — PATIENT INSTRUCTIONS
Please maintain pelvic rest until at least next visit  We will call you if anything is abnormal from your testing today.   Please contact the office or seek immediate care if you develop fever > 101.0, severe lower abdominal pain or heavy vaginal bleeding (soaking 2 or more pads per hour).   Thanks for coming to see us today and letting us take care of you!

## 2024-07-25 NOTE — PROGRESS NOTES
Chief Complaint   Patient presents with    Initial Prenatal Visit    Ultrasound        This 33 y.o.  at 7w2d with Estimated Date of Delivery: 3/11/25 presents for routine prenatal visit. Patient has some complaints today. Pt reports good FM, but (+) VB. Pt denies H/A, vision changes, abdom pain, N/V.    Vitals:    24 1451   BP: 116/62   Site: Right Upper Arm   Position: Sitting   Weight: 80.7 kg (178 lb)   Height: 1.803 m (5' 11\")        Patient Active Problem List    Diagnosis Date Noted    Multigravida of advanced maternal age in first trimester 2024     Overview Note:     EDC by 7 2 week US not C/W LMP    PLAN:  NIPT, baby ASA 12-36 weeks, MFM for anatomy/fetal echo,  testing 32-34 weeks, delivery around 39 weeks    21:  CF, SMA, Fragile X all neg       Assessment & Plan Note:      Instructed pt to contact the office or seek immediate care if develops fever > 101.0, severe lower abdominal pain or heavy vaginal bleeding (soaking 2 or more pads per hour).    PNLs, new OB packet today      Subchorionic hemorrhage of placenta in first trimester 2024     Overview Note:     24:  1.5 x 1.4 cm -- pelvic rest       Assessment & Plan Note:     Noted - routine precautions      Drug use affecting pregnancy in first trimester 2024     Overview Note:     Pt reports using Kratom prior to NOB - encouraged immed cessation       Assessment & Plan Note:     noted        Problem List Items Addressed This Visit              Multigravida of advanced maternal age in first trimester - Primary      Instructed pt to contact the office or seek immediate care if develops fever > 101.0, severe lower abdominal pain or heavy vaginal bleeding (soaking 2 or more pads per hour).    PNLs, new OB packet today         Relevant Orders    ABO/Rh    Antibody Screen    CBC with Auto Differential    Chlamydia, Gonorrhea, Trichomoniasis    Hemoglobin A1C    Hepatitis B Surface Antigen    Hepatitis C

## 2024-07-25 NOTE — ASSESSMENT & PLAN NOTE
Instructed pt to contact the office or seek immediate care if develops fever > 101.0, severe lower abdominal pain or heavy vaginal bleeding (soaking 2 or more pads per hour).    PNLs, new OB packet today

## 2024-07-25 NOTE — TELEPHONE ENCOUNTER
Called patient, no answer, LVM with no details given.     Patient called back, updated her on labs, patient states that she is still bleeding and cramping, changes her pad every 2-3 hours. Patient states her bleeding has turned darker.     Reviewed with the provider-provider states that patient needs visit and US today.   Scheduled patient for today 7/25/24 at 2:30pm after speaking with the patient. Patient verbalized understanding.

## 2024-07-30 LAB
C TRACH RRNA SPEC QL NAA+PROBE: NEGATIVE
N GONORRHOEA RRNA SPEC QL NAA+PROBE: NEGATIVE
SPECIMEN SOURCE: NORMAL
T VAGINALIS RRNA SPEC QL NAA+PROBE: NEGATIVE

## 2024-08-08 ENCOUNTER — TELEPHONE (OUTPATIENT)
Dept: OBGYN CLINIC | Age: 34
End: 2024-08-08

## 2024-08-08 NOTE — TELEPHONE ENCOUNTER
Pt lvm stating she has a subchorionic hematoma and in the middle of the night she has bright red spotting but it is not enough to fill up a pad. Pt states she stopped bleeding for 2 weeks so wants to know if this should be concerning. Called pt back, pt states the bleeding has gotten to be more than spotting. Pt states since she got out of bed she has had brown blood until she had a bowel movement and had a gush of bright red but has been very constipated. States she has slight cramping but blames that on the constipation. Pt states it is not on the pad she just notices it when she wipes. Informed pt that I spoke to [place], advised pt to stay hydrated and relaxed and that we would like for her to come in tomorrow for an appointment US and visit. Advised pt that if the bleeding becomes heavier to the point she is filling up a pad or develops severe abdominal pain then she will need to go to the ER. Pt voiced understanding and stated she may need to bring her son to her appt tomorrow and asked if it was ok. Informed pt that it is ok.

## 2024-08-09 ENCOUNTER — PROCEDURE VISIT (OUTPATIENT)
Dept: OBGYN CLINIC | Age: 34
End: 2024-08-09

## 2024-08-09 ENCOUNTER — ROUTINE PRENATAL (OUTPATIENT)
Dept: OBGYN CLINIC | Age: 34
End: 2024-08-09

## 2024-08-09 VITALS
WEIGHT: 177 LBS | HEIGHT: 71 IN | DIASTOLIC BLOOD PRESSURE: 64 MMHG | SYSTOLIC BLOOD PRESSURE: 110 MMHG | BODY MASS INDEX: 24.78 KG/M2

## 2024-08-09 DIAGNOSIS — O99.321 DRUG USE AFFECTING PREGNANCY IN FIRST TRIMESTER: ICD-10-CM

## 2024-08-09 DIAGNOSIS — O20.8 SUBCHORIONIC HEMORRHAGE OF PLACENTA IN FIRST TRIMESTER: ICD-10-CM

## 2024-08-09 DIAGNOSIS — O09.521 MULTIGRAVIDA OF ADVANCED MATERNAL AGE IN FIRST TRIMESTER: ICD-10-CM

## 2024-08-09 DIAGNOSIS — O20.9 VAGINAL BLEEDING IN PREGNANCY, FIRST TRIMESTER: Primary | ICD-10-CM

## 2024-08-09 DIAGNOSIS — O20.8 SUBCHORIONIC HEMORRHAGE OF PLACENTA IN FIRST TRIMESTER: Primary | ICD-10-CM

## 2024-08-09 DIAGNOSIS — O09.521 SUPERVISION OF ELDERLY MULTIGRAVIDA IN FIRST TRIMESTER: ICD-10-CM

## 2024-08-09 PROCEDURE — 0502F SUBSEQUENT PRENATAL CARE: CPT | Performed by: NURSE PRACTITIONER

## 2024-08-09 NOTE — PROGRESS NOTES
Chaperone for Intimate Exam     Chaperone was offer accepted as part of the rooming process    Chaperone: Imani Soria  
I have reviewed the patient's visit today including history, exam and assessment by LYNDA Rosenberg.  I agree with treatment/plan as above.    Tristen Schaffer MD  11:20 AM  08/09/24   
Patient comes in today for routine prenatal visit. No complaints/concerns today.     Fetal Movement: No  Contractions: No  Vaginal Bleeding: Yes-brownish discharge   Leaking Fluid: No  GI/: yes-constipation, educated patient.     Vitals:    08/09/24 1041   BP: 110/64   Site: Right Upper Arm   Position: Sitting   Weight: 80.3 kg (177 lb)   Height: 1.803 m (5' 11\")        
History    Not on file   Tobacco Use    Smoking status: Never    Smokeless tobacco: Never   Substance and Sexual Activity    Alcohol use: Not Currently    Drug use: Never    Sexual activity: Yes     Partners: Male     Birth control/protection: None   Other Topics Concern    Not on file   Social History Narrative    Abuse: Feels safe at home, no history of physical abuse, no history of sexual abuse       Social Determinants of Health     Financial Resource Strain: Low Risk  (7/25/2024)    Overall Financial Resource Strain (CARDIA)     Difficulty of Paying Living Expenses: Not hard at all   Food Insecurity: No Food Insecurity (7/25/2024)    Hunger Vital Sign     Worried About Running Out of Food in the Last Year: Never true     Ran Out of Food in the Last Year: Never true   Transportation Needs: Unknown (7/25/2024)    PRAPARE - Transportation     Lack of Transportation (Medical): Not on file     Lack of Transportation (Non-Medical): No   Physical Activity: Not on file   Stress: Not on file   Social Connections: Not on file   Intimate Partner Violence: Not on file   Housing Stability: Unknown (7/25/2024)    Housing Stability Vital Sign     Unable to Pay for Housing in the Last Year: Not on file     Number of Places Lived in the Last Year: Not on file     Unstable Housing in the Last Year: No           Objective    Vitals:    08/09/24 1041   BP: 110/64   Site: Right Upper Arm   Position: Sitting   Weight: 80.3 kg (177 lb)   Height: 1.803 m (5' 11\")       General: well developed, well nourished, in no acute distress    Head: normocephalic and atraumatic    Resp: even and unlabored    Pelvic Exam:       External: normal female genitalia without lesions or masses       Vagina: normal without lesions or masses, scant brown discharge noted      Cervix: normal without lesions or masses   Visually closed      Psych: Normal mood and affect        Assessment and Plan      Patient Active Problem List    Diagnosis Date Noted

## 2024-08-09 NOTE — ASSESSMENT & PLAN NOTE
PTL/labor precautions, FMC, and pregnancy warning signs reviewed. Pt advised to call the office at [phone] or go straight to Labor and Delivery at [facility] with any of the following concerns vaginal bleeding, leaking of fluid, yvonne regularly Q 5-7 minutes for over an hour or not feeling the baby move.   RTO as scheduled 8/28  NIPT today

## 2024-08-11 ENCOUNTER — TELEPHONE (OUTPATIENT)
Dept: OBGYN CLINIC | Age: 34
End: 2024-08-11

## 2024-08-11 LAB
Lab: NORMAL
NTRA FETAL FRACTION: NORMAL
NTRA FETAL RHD SUMMARY: NORMAL
NTRA GENDER OF FETUS: NORMAL
NTRA MONOSOMY X AGE-BASED RISK TEXT: NORMAL
NTRA MONOSOMY X RESULT TEXT: NORMAL
NTRA MONOSOMY X RISK SCORE TEXT: NORMAL
NTRA TRIPLOIDY RESULT TEXT: NORMAL
NTRA TRISOMY 13 AGE-BASED RISK TEXT: NORMAL
NTRA TRISOMY 13 RESULT TEXT: NORMAL
NTRA TRISOMY 13 RISK SCORE TEXT: NORMAL
NTRA TRISOMY 18 AGE-BASED RISK TEXT: NORMAL
NTRA TRISOMY 18 RESULT TEXT: NORMAL
NTRA TRISOMY 18 RISK SCORE TEXT: NORMAL
NTRA TRISOMY 21 AGE-BASED RISK TEXT: NORMAL
NTRA TRISOMY 21 RESULT TEXT: NORMAL
NTRA TRISOMY 21 RISK SCORE TEXT: NORMAL

## 2024-08-11 NOTE — TELEPHONE ENCOUNTER
Please let pt know NIPT will need to be redrawn unfortunately the collection tube was .    She can come in anytime this week or we can redraw with her next visit. She will NOT get charged twice for this test, only once as it was a lab error.    Our apologies

## 2024-08-12 NOTE — TELEPHONE ENCOUNTER
Called pt, message below reviewed with pt, pt voiced understanding and stated she needs to speak to her  first before scheduling NIPT. Informed her I will add a note to her next visit just in case.

## 2024-08-15 ENCOUNTER — LAB (OUTPATIENT)
Dept: OBGYN CLINIC | Age: 34
End: 2024-08-15

## 2024-08-15 DIAGNOSIS — O09.521 SUPERVISION OF ELDERLY MULTIGRAVIDA IN FIRST TRIMESTER: ICD-10-CM

## 2024-08-22 LAB
Lab: ABNORMAL
NTRA FETAL FRACTION: ABNORMAL
NTRA FETAL RHD SUMMARY: ABNORMAL
NTRA GENDER OF FETUS: ABNORMAL
NTRA MONOSOMY X AGE-BASED RISK TEXT: ABNORMAL
NTRA MONOSOMY X RESULT TEXT: ABNORMAL
NTRA MONOSOMY X RISK SCORE TEXT: ABNORMAL
NTRA TRIPLOIDY RESULT TEXT: ABNORMAL
NTRA TRISOMY 13 AGE-BASED RISK TEXT: ABNORMAL
NTRA TRISOMY 13 RESULT TEXT: ABNORMAL
NTRA TRISOMY 13 RISK SCORE TEXT: ABNORMAL
NTRA TRISOMY 18 AGE-BASED RISK TEXT: ABNORMAL
NTRA TRISOMY 18 RESULT TEXT: ABNORMAL
NTRA TRISOMY 18 RISK SCORE TEXT: ABNORMAL
NTRA TRISOMY 21 AGE-BASED RISK TEXT: ABNORMAL
NTRA TRISOMY 21 RESULT TEXT: ABNORMAL
NTRA TRISOMY 21 RISK SCORE TEXT: ABNORMAL

## 2024-08-23 ENCOUNTER — TELEPHONE (OUTPATIENT)
Dept: OBGYN CLINIC | Age: 34
End: 2024-08-23

## 2024-08-23 NOTE — TELEPHONE ENCOUNTER
Called patient, updated her on normal/negative genetic results and asked if she wanted to know the gender of the baby.  Patient verbalized understanding and declines wanting to know the gender at this time. Patient knows to not look on mychart as the gender is listed in the results.

## 2024-08-23 NOTE — TELEPHONE ENCOUNTER
----- Message from Dr. Tristen Schaffer MD sent at 8/23/2024  8:13 AM EDT -----  Please let pt know that all her genetic testing was normal/negative  If she wants to know -- it's a BOY

## 2024-08-28 ENCOUNTER — ROUTINE PRENATAL (OUTPATIENT)
Dept: OBGYN CLINIC | Age: 34
End: 2024-08-28

## 2024-08-28 ENCOUNTER — PROCEDURE VISIT (OUTPATIENT)
Dept: OBGYN CLINIC | Age: 34
End: 2024-08-28
Payer: COMMERCIAL

## 2024-08-28 VITALS
HEIGHT: 71 IN | WEIGHT: 178 LBS | SYSTOLIC BLOOD PRESSURE: 106 MMHG | DIASTOLIC BLOOD PRESSURE: 64 MMHG | BODY MASS INDEX: 24.92 KG/M2

## 2024-08-28 DIAGNOSIS — O99.321 DRUG USE AFFECTING PREGNANCY IN FIRST TRIMESTER: ICD-10-CM

## 2024-08-28 DIAGNOSIS — O20.8 SUBCHORIONIC HEMORRHAGE OF PLACENTA IN FIRST TRIMESTER: Primary | ICD-10-CM

## 2024-08-28 DIAGNOSIS — O09.521 MULTIGRAVIDA OF ADVANCED MATERNAL AGE IN FIRST TRIMESTER: Primary | ICD-10-CM

## 2024-08-28 DIAGNOSIS — O09.521 MULTIGRAVIDA OF ADVANCED MATERNAL AGE IN FIRST TRIMESTER: ICD-10-CM

## 2024-08-28 DIAGNOSIS — O20.8 SUBCHORIONIC HEMORRHAGE OF PLACENTA IN FIRST TRIMESTER: ICD-10-CM

## 2024-08-28 DIAGNOSIS — O09.521 SUPERVISION OF ELDERLY MULTIGRAVIDA IN FIRST TRIMESTER: ICD-10-CM

## 2024-08-28 PROCEDURE — 0502F SUBSEQUENT PRENATAL CARE: CPT | Performed by: NURSE PRACTITIONER

## 2024-08-28 PROCEDURE — 76801 OB US < 14 WKS SINGLE FETUS: CPT | Performed by: OBSTETRICS & GYNECOLOGY

## 2024-08-28 RX ORDER — ASPIRIN 81 MG/1
162 TABLET, CHEWABLE ORAL DAILY
Qty: 60 TABLET | Refills: 0
Start: 2024-08-28

## 2024-08-28 NOTE — PROGRESS NOTES
Patient comes in today for routine prenatal visit. No complaints/concerns today.     Fetal Movement: No  Contractions: No  Vaginal Bleeding: No  Leaking Fluid: No  GI/: Yes-n/v, reviewed patients options as patient tries not to use zofran due to SE of constipation    Vitals:    08/28/24 0818   BP: 106/64   Site: Right Upper Arm   Position: Sitting   Weight: 80.7 kg (178 lb)   Height: 1.803 m (5' 11\")

## 2024-08-28 NOTE — PROGRESS NOTES
This is a 33 y.o.   at 12w1d for routine OB visit.    Her Estimated Due Date is 3/11/2025, by Ultrasound    Denies leaking of fluid, vaginal bleeding, or regular contractions.     Current Outpatient Medications on File Prior to Visit   Medication Sig Dispense Refill    Prenatal w/o A Vit-Fe Fum-FA (PRENATA PO) Take by mouth Gummies      Doxylamine Succinate, Sleep, (UNISOM PO) Take by mouth      ondansetron (ZOFRAN-ODT) 4 MG disintegrating tablet Take 1 tablet by mouth 3 times daily as needed for Nausea or Vomiting 28 tablet 1     No current facility-administered medications on file prior to visit.       Allergies   Allergen Reactions    Shrimp (Diagnostic) Itching, Palpitations, Shortness Of Breath and Swelling       OB History    Para Term  AB Living   2 1 1 0 0 1   SAB IAB Ectopic Molar Multiple Live Births   0 0 0 0 0 1       # 1 - Date: 21, Sex: Male, Weight: 2.835 kg (6 lb 4 oz), GA: 40w0d, Type: Vaginal, Spontaneous, Apgar1: 9, Apgar5: 9, Living: Living, Birth Comments: None    # 2 - Date: None, Sex: None, Weight: None, GA: None, Type: None, Apgar1: None, Apgar5: None, Living: None, Birth Comments: None        Past Medical History:   Diagnosis Date    ADD (attention deficit disorder)     H/O cold sores     no hx of genital lesions    Psychiatric problem     depression       Past Surgical History:   Procedure Laterality Date    TONSILLECTOMY         Family History   Problem Relation Age of Onset    Stroke Maternal Grandmother     Diabetes Maternal Grandfather     Colon Cancer Neg Hx     Ovarian Cancer Neg Hx     Uterine Cancer Neg Hx     Breast Cancer Neg Hx        Social History     Socioeconomic History    Marital status:      Spouse name: Not on file    Number of children: Not on file    Years of education: Not on file    Highest education level: Not on file   Occupational History    Not on file   Tobacco Use    Smoking status: Never    Smokeless tobacco: Never   Substance  Note:     noted         Problem List Items Addressed This Visit          Other    Subchorionic hemorrhage of placenta in first trimester    Multigravida of advanced maternal age in first trimester - Primary      D/W pt at length genetic testing that is recommended by ACOG -- NIPT, Quad screen (for trisomies and NTD), CF, SMA.  Brief discussion of these diseases - conditions that may increase risks, etiology, carrier states, fetal effects, treatment options, etc was undertaken. D/W pt that these are screening tests/carrier screening test only and are NOT mandatory. We also discuss false POS/false neg rates. It is her decision whether to have them done and how to proceed with the information afterwards.  All questions answered, pt understood and wishes to proceed with indicated tests.    PTL/labor precautions, FMC, and pregnancy warning signs reviewed. Pt advised to call the office at [phone] or go straight to Labor and Delivery at [facility] with any of the following concerns vaginal bleeding, leaking of fluid, yvonne regularly Q 5-7 minutes for over an hour or not feeling the baby move.   RTO 4 weeks OBV, afp  MFM referral sent  Advised to start ASA         Relevant Medications    aspirin (ASPIRIN ADULT LOW STRENGTH) 81 MG chewable tablet    Other Relevant Orders    Ambulatory referral to Maternal Fetal    Drug use affecting pregnancy in first trimester     noted            Orders Placed This Encounter   Procedures    Ambulatory referral to Maternal Fetal       Outpatient Encounter Medications as of 8/28/2024   Medication Sig Dispense Refill    aspirin (ASPIRIN ADULT LOW STRENGTH) 81 MG chewable tablet Take 2 tablets by mouth daily 60 tablet 0    Prenatal w/o A Vit-Fe Fum-FA (PRENATA PO) Take by mouth Gummies      Doxylamine Succinate, Sleep, (UNISOM PO) Take by mouth      ondansetron (ZOFRAN-ODT) 4 MG disintegrating tablet Take 1 tablet by mouth 3 times daily as needed for Nausea or Vomiting 28

## 2024-08-28 NOTE — PROGRESS NOTES
I have reviewed the patient's visit today including history, exam and assessment by LYNDA Rosenberg.  I agree with treatment/plan as above.    Tristen Schaffer MD  9:19 AM  08/28/24

## 2024-08-28 NOTE — ASSESSMENT & PLAN NOTE
D/W pt at length genetic testing that is recommended by ACOG -- NIPT, Quad screen (for trisomies and NTD), CF, SMA.  Brief discussion of these diseases - conditions that may increase risks, etiology, carrier states, fetal effects, treatment options, etc was undertaken. D/W pt that these are screening tests/carrier screening test only and are NOT mandatory. We also discuss false POS/false neg rates. It is her decision whether to have them done and how to proceed with the information afterwards.  All questions answered, pt understood and wishes to proceed with indicated tests.    PTL/labor precautions, FMC, and pregnancy warning signs reviewed. Pt advised to call the office at [phone] or go straight to Labor and Delivery at [facility] with any of the following concerns vaginal bleeding, leaking of fluid, yvonne regularly Q 5-7 minutes for over an hour or not feeling the baby move.   RTO 4 weeks OBV, afp  MFM referral sent  Advised to start ASA

## 2024-09-04 PROBLEM — F32.A ANXIETY AND DEPRESSION: Status: RESOLVED | Noted: 2019-08-22 | Resolved: 2024-09-04

## 2024-09-04 PROBLEM — F40.243 FLYING PHOBIA: Status: RESOLVED | Noted: 2019-08-22 | Resolved: 2024-09-04

## 2024-09-04 PROBLEM — H52.10 MYOPIA: Status: RESOLVED | Noted: 2020-07-07 | Resolved: 2024-09-04

## 2024-09-04 PROBLEM — E78.2 MIXED HYPERLIPIDEMIA: Status: RESOLVED | Noted: 2019-11-17 | Resolved: 2024-09-04

## 2024-09-04 PROBLEM — F41.9 ANXIETY AND DEPRESSION: Status: RESOLVED | Noted: 2019-08-22 | Resolved: 2024-09-04

## 2024-09-04 PROBLEM — Z86.59 HISTORY OF ANXIETY: Status: ACTIVE | Noted: 2024-09-04

## 2024-09-05 ENCOUNTER — TELEPHONE (OUTPATIENT)
Dept: OBGYN CLINIC | Age: 34
End: 2024-09-05

## 2024-09-05 NOTE — TELEPHONE ENCOUNTER
Dr. Schaffer,   Patient sent message below:    Hello,  When I was there last, it looked like the subchorionic hematoma was resolved. Today I’m having some minimal brown spotting. Could this be a small bit of remnants coming out? I’m also fairly constipated waiting for the collace to kick in and if I remember correctly, I spotted when constipated with my first. I have a sonogram with maternal fetal medicine on Monday, would I be ok to wait until then assuming the spotting doesn’t intensify? Thank you!

## 2024-09-05 NOTE — TELEPHONE ENCOUNTER
Yes, this is very common.  If bleeding intensifies, she can be evaluated sooner.  She can also take Miralax over the counter to help with constipation

## 2024-09-09 ENCOUNTER — ROUTINE PRENATAL (OUTPATIENT)
Dept: OBGYN CLINIC | Age: 34
End: 2024-09-09
Payer: COMMERCIAL

## 2024-09-09 VITALS — DIASTOLIC BLOOD PRESSURE: 70 MMHG | SYSTOLIC BLOOD PRESSURE: 115 MMHG

## 2024-09-09 DIAGNOSIS — Z3A.13 13 WEEKS GESTATION OF PREGNANCY: ICD-10-CM

## 2024-09-09 DIAGNOSIS — Z86.59 HISTORY OF ANXIETY: ICD-10-CM

## 2024-09-09 DIAGNOSIS — O20.8 SUBCHORIONIC HEMORRHAGE OF PLACENTA IN FIRST TRIMESTER: ICD-10-CM

## 2024-09-09 DIAGNOSIS — O09.521 MULTIGRAVIDA OF ADVANCED MATERNAL AGE IN FIRST TRIMESTER: Primary | ICD-10-CM

## 2024-09-09 DIAGNOSIS — O99.321 DRUG USE AFFECTING PREGNANCY IN FIRST TRIMESTER: ICD-10-CM

## 2024-09-09 PROCEDURE — 76813 OB US NUCHAL MEAS 1 GEST: CPT | Performed by: OBSTETRICS & GYNECOLOGY

## 2024-09-09 PROCEDURE — 76801 OB US < 14 WKS SINGLE FETUS: CPT | Performed by: OBSTETRICS & GYNECOLOGY

## 2024-09-09 PROCEDURE — 99204 OFFICE O/P NEW MOD 45 MIN: CPT | Performed by: OBSTETRICS & GYNECOLOGY

## 2024-09-09 ASSESSMENT — PATIENT HEALTH QUESTIONNAIRE - PHQ9
2. FEELING DOWN, DEPRESSED OR HOPELESS: NOT AT ALL
SUM OF ALL RESPONSES TO PHQ9 QUESTIONS 1 & 2: 0
SUM OF ALL RESPONSES TO PHQ QUESTIONS 1-9: 0
1. LITTLE INTEREST OR PLEASURE IN DOING THINGS: NOT AT ALL
SUM OF ALL RESPONSES TO PHQ QUESTIONS 1-9: 0

## 2024-09-25 ENCOUNTER — ROUTINE PRENATAL (OUTPATIENT)
Dept: OBGYN CLINIC | Age: 34
End: 2024-09-25
Payer: COMMERCIAL

## 2024-09-25 VITALS
HEIGHT: 71 IN | BODY MASS INDEX: 25.48 KG/M2 | DIASTOLIC BLOOD PRESSURE: 70 MMHG | WEIGHT: 182 LBS | SYSTOLIC BLOOD PRESSURE: 118 MMHG

## 2024-09-25 DIAGNOSIS — O09.522 MULTIGRAVIDA OF ADVANCED MATERNAL AGE IN SECOND TRIMESTER: Primary | ICD-10-CM

## 2024-09-25 DIAGNOSIS — R35.0 URINE FREQUENCY: ICD-10-CM

## 2024-09-25 DIAGNOSIS — O09.522 AMA (ADVANCED MATERNAL AGE) MULTIGRAVIDA 35+, SECOND TRIMESTER: ICD-10-CM

## 2024-09-25 DIAGNOSIS — R63.1 EXCESSIVE THIRST: ICD-10-CM

## 2024-09-25 LAB
ALBUMIN SERPL-MCNC: 3.4 G/DL (ref 3.5–5)
ALBUMIN/GLOB SERPL: 1 (ref 1–1.9)
ALP SERPL-CCNC: 54 U/L (ref 35–104)
ALT SERPL-CCNC: 16 U/L (ref 8–45)
ANION GAP SERPL CALC-SCNC: 11 MMOL/L (ref 9–18)
AST SERPL-CCNC: 15 U/L (ref 15–37)
BILIRUB SERPL-MCNC: 0.4 MG/DL (ref 0–1.2)
BILIRUBIN, URINE, POC: NEGATIVE
BLOOD URINE, POC: NEGATIVE
BUN SERPL-MCNC: 9 MG/DL (ref 6–23)
CALCIUM SERPL-MCNC: 9.5 MG/DL (ref 8.8–10.2)
CHLORIDE SERPL-SCNC: 103 MMOL/L (ref 98–107)
CO2 SERPL-SCNC: 23 MMOL/L (ref 20–28)
CREAT SERPL-MCNC: 0.63 MG/DL (ref 0.6–1.1)
ERYTHROCYTE [DISTWIDTH] IN BLOOD BY AUTOMATED COUNT: 12.7 % (ref 11.9–14.6)
GLOBULIN SER CALC-MCNC: 3.5 G/DL (ref 2.3–3.5)
GLUCOSE SERPL-MCNC: 82 MG/DL (ref 70–99)
GLUCOSE URINE, POC: NEGATIVE
HCT VFR BLD AUTO: 39.2 % (ref 35.8–46.3)
HGB BLD-MCNC: 13.2 G/DL (ref 11.7–15.4)
KETONES, URINE, POC: NEGATIVE
LEUKOCYTE ESTERASE, URINE, POC: ABNORMAL
MCH RBC QN AUTO: 32 PG (ref 26.1–32.9)
MCHC RBC AUTO-ENTMCNC: 33.7 G/DL (ref 31.4–35)
MCV RBC AUTO: 94.9 FL (ref 82–102)
NITRITE, URINE, POC: NEGATIVE
NRBC # BLD: 0 K/UL (ref 0–0.2)
PH, URINE, POC: 0.2 (ref 4.6–8)
PLATELET # BLD AUTO: 254 K/UL (ref 150–450)
PMV BLD AUTO: 11.3 FL (ref 9.4–12.3)
POTASSIUM SERPL-SCNC: 4.3 MMOL/L (ref 3.5–5.1)
PROT SERPL-MCNC: 6.9 G/DL (ref 6.3–8.2)
PROTEIN,URINE, POC: NEGATIVE
RBC # BLD AUTO: 4.13 M/UL (ref 4.05–5.2)
SODIUM SERPL-SCNC: 137 MMOL/L (ref 136–145)
SPECIFIC GRAVITY, URINE, POC: 1.02 (ref 1–1.03)
URINALYSIS CLARITY, POC: CLEAR
URINALYSIS COLOR, POC: YELLOW
UROBILINOGEN, POC: ABNORMAL
WBC # BLD AUTO: 11.1 K/UL (ref 4.3–11.1)

## 2024-09-25 PROCEDURE — 81001 URINALYSIS AUTO W/SCOPE: CPT | Performed by: NURSE PRACTITIONER

## 2024-09-25 PROCEDURE — 0502F SUBSEQUENT PRENATAL CARE: CPT | Performed by: NURSE PRACTITIONER

## 2024-09-30 LAB
AFP INTERP SERPL-IMP: NORMAL
AFP MOM SERPL: 1.12
AFP SERPL-MCNC: 33.1 NG/ML
AGE AT DELIVERY: 36.5 YR
COMMENT: NORMAL
DONOR EGG?: NO
GA METHOD: NORMAL
GA: 16.1 WEEKS
IDDM PATIENT QL: NO
Lab: 182
Lab: NORMAL
MAT SCN FOR FETAL ABNORMALITIES SERPL: NORMAL
MULTIPLE PREGNANCY: NO
NEURAL TUBE DEFECT RISK FETUS: 8371
NUMBER OF FETUSES: NO
OTHER INDICATIONS: NO
PREVIOUSLY ELEVATED AFP (Y OR N): 16.1
PREVIOUSLY ELEVATED AFP (Y OR N): NO
PRIOR 1ST TRIM TESTING ?: NO
PRIOR 2ND TRIM TESTING ?: NO
PRIOR DS/NTD SCREEN CURRENT PREGNANCY?: NO
PRIOR PREGNANCY WITH DOWN SYNDROME (Y OR N): 1
PRIOR PREGNANCY WITH DOWN SYNDROME (Y OR N): NO
TYPE OF EGG DONOR: NORMAL

## 2024-10-24 ENCOUNTER — ROUTINE PRENATAL (OUTPATIENT)
Dept: OBGYN CLINIC | Age: 34
End: 2024-10-24

## 2024-10-24 VITALS
BODY MASS INDEX: 26.04 KG/M2 | DIASTOLIC BLOOD PRESSURE: 66 MMHG | HEIGHT: 71 IN | WEIGHT: 186 LBS | SYSTOLIC BLOOD PRESSURE: 110 MMHG

## 2024-10-24 DIAGNOSIS — Z86.59 HISTORY OF ANXIETY: ICD-10-CM

## 2024-10-24 DIAGNOSIS — O99.321 DRUG USE AFFECTING PREGNANCY IN FIRST TRIMESTER: Primary | ICD-10-CM

## 2024-10-24 DIAGNOSIS — O09.522 MULTIGRAVIDA OF ADVANCED MATERNAL AGE IN SECOND TRIMESTER: ICD-10-CM

## 2024-10-24 PROCEDURE — 0502F SUBSEQUENT PRENATAL CARE: CPT | Performed by: NURSE PRACTITIONER

## 2024-10-24 NOTE — PROGRESS NOTES
This is a 34 y.o.   at 20w2d for routine OB visit.    Her Estimated Due Date is 3/11/2025, by Ultrasound    Denies leaking of fluid, vaginal bleeding, or regular contractions. Reports fetal movement.     Current Outpatient Medications on File Prior to Visit   Medication Sig Dispense Refill    Calcium Carbonate Antacid (TUMS E-X PO) Take by mouth      aspirin (ASPIRIN ADULT LOW STRENGTH) 81 MG chewable tablet Take 2 tablets by mouth daily 60 tablet 0    Prenatal w/o A Vit-Fe Fum-FA (PRENATA PO) Take by mouth Gummies      Doxylamine Succinate, Sleep, (UNISOM PO) Take by mouth       No current facility-administered medications on file prior to visit.       Allergies   Allergen Reactions    Shrimp (Diagnostic) Itching, Palpitations, Shortness Of Breath and Swelling       OB History    Para Term  AB Living   2 1 1 0 0 1   SAB IAB Ectopic Molar Multiple Live Births   0 0 0 0 0 1       # 1 - Date: 21, Sex: Male, Weight: 2.835 kg (6 lb 4 oz), GA: 40w0d, Type: Vaginal, Spontaneous, Apgar1: 9, Apgar5: 9, Living: Living, Birth Comments: None    # 2 - Date: None, Sex: None, Weight: None, GA: None, Type: None, Apgar1: None, Apgar5: None, Living: None, Birth Comments: None        Past Medical History:   Diagnosis Date    ADD (attention deficit disorder)     Anxiety and depression 2019    Medication use agreement signed 19  Xanax only prn. Mainly for flying.   Doesn't want to see therapist currently bc previous psychologist  suddenly at a young age and she is nervous to get connected to someone again.      Diagnosed in college. Was also told she had ADD but pt doesn't believe that, doesn't take any meds.      Flying phobia 2019    H/O cold sores     no hx of genital lesions    Mixed hyperlipidemia 2019    Myopia 2020    Psychiatric problem     depression       Past Surgical History:   Procedure Laterality Date    TONSILLECTOMY         Family History   Problem Relation Age

## 2024-10-24 NOTE — PROGRESS NOTES
I have reviewed the patient's visit today including history, exam and assessment by LYNDA Rosenberg.  I agree with treatment/plan as above.    Tristen Schaffer MD  10:57 AM  10/24/24

## 2024-10-24 NOTE — ASSESSMENT & PLAN NOTE
PTL/labor precautions, FMC, and pregnancy warning signs reviewed. Pt advised to call the office at [phone] or go straight to Labor and Delivery at [facility] with any of the following concerns vaginal bleeding, leaking of fluid, yvonne regularly Q 5-7 minutes for over an hour or not feeling the baby move.   Rto 4 weeks OBV  Sees MFM next week for anatomy

## 2024-10-24 NOTE — PROGRESS NOTES
Patient comes in today for routine prenatal visit. No complaints/concerns today.     Fetal Movement: Yes  Contractions: No  Vaginal Bleeding: No  Leaking Fluid: No  GI/: yes-mild nausea.     Vitals:    10/24/24 1019   BP: 110/66   Site: Left Upper Arm   Position: Sitting   Weight: 84.4 kg (186 lb)   Height: 1.803 m (5' 11\")

## 2024-10-30 ENCOUNTER — ROUTINE PRENATAL (OUTPATIENT)
Dept: OBGYN CLINIC | Age: 34
End: 2024-10-30
Payer: COMMERCIAL

## 2024-10-30 VITALS — DIASTOLIC BLOOD PRESSURE: 72 MMHG | SYSTOLIC BLOOD PRESSURE: 108 MMHG

## 2024-10-30 DIAGNOSIS — O09.522 MULTIGRAVIDA OF ADVANCED MATERNAL AGE IN SECOND TRIMESTER: Primary | ICD-10-CM

## 2024-10-30 DIAGNOSIS — O43.192 MARGINAL INSERTION OF UMBILICAL CORD AFFECTING MANAGEMENT OF MOTHER IN SECOND TRIMESTER: ICD-10-CM

## 2024-10-30 DIAGNOSIS — O99.322 DRUG USE AFFECTING PREGNANCY IN SECOND TRIMESTER: ICD-10-CM

## 2024-10-30 DIAGNOSIS — Z3A.21 21 WEEKS GESTATION OF PREGNANCY: ICD-10-CM

## 2024-10-30 DIAGNOSIS — Z86.59 HISTORY OF ANXIETY: ICD-10-CM

## 2024-10-30 DIAGNOSIS — O46.8X9 SUBCHORIONIC HEMORRHAGE OF PLACENTA, ANTEPARTUM: ICD-10-CM

## 2024-10-30 PROCEDURE — 93325 DOPPLER ECHO COLOR FLOW MAPG: CPT | Performed by: OBSTETRICS & GYNECOLOGY

## 2024-10-30 PROCEDURE — 76811 OB US DETAILED SNGL FETUS: CPT | Performed by: OBSTETRICS & GYNECOLOGY

## 2024-10-30 PROCEDURE — 99213 OFFICE O/P EST LOW 20 MIN: CPT | Performed by: OBSTETRICS & GYNECOLOGY

## 2024-10-30 PROCEDURE — 76825 ECHO EXAM OF FETAL HEART: CPT | Performed by: OBSTETRICS & GYNECOLOGY

## 2024-10-30 PROCEDURE — 76827 ECHO EXAM OF FETAL HEART: CPT | Performed by: OBSTETRICS & GYNECOLOGY

## 2024-10-30 ASSESSMENT — PATIENT HEALTH QUESTIONNAIRE - PHQ9
1. LITTLE INTEREST OR PLEASURE IN DOING THINGS: NOT AT ALL
SUM OF ALL RESPONSES TO PHQ QUESTIONS 1-9: 0
SUM OF ALL RESPONSES TO PHQ9 QUESTIONS 1 & 2: 0
2. FEELING DOWN, DEPRESSED OR HOPELESS: NOT AT ALL

## 2024-10-30 NOTE — PATIENT INSTRUCTIONS
Your Maternity Check List   Before Arriving to the Hospital     Find an OBGYN Doctor: [email or web address]  Maternity Pre-registration for Hospital: [email or web address]  Sign up for a Hospital Tour: [email or web address]  [place] for Prenatal Classes: [email or web address]   Car seat Safety Check: https://www.[email or web address].org/coalition/safe-kids-[facility]   Learn More: [email or web address]/health-care-services/[email or web address]-health/maternity   Download the [employer] Pregnancy Ashley: (Scan QR Code below):  See educational videos, track your pregnancy, and Mom/Baby Care videos          Resources for Depression/Anxiety  Postpartum Support International (PSI).    PSI Warmline:  7-751-966-4PPD (6833).  WWW.POSTPARTUM.NET    Mom's IMPACTT  [email or web address]-[email or web address]-[email or web address]-health/moms-impactt       In order to optimize maternal, fetal, and  health, we recommend the following vaccinations.   Flu- yearly (https://www.highriskpregnancyinfo.org/flu-facts-for-pregnancy)  Consider Covid vaccination/booster (https://www.highriskpregnancyinfo.org/covid-19-pregnancy)  TDaP after 28 weeks each pregnancy (https://www.highriskpregnancyinfo.org/tdap)  Consider RSV vaccine 32-36 weeks of pregnancy, if Sept- January.  (https://www.highriskpregnancyinfo.org/rsv)

## 2024-10-30 NOTE — PROGRESS NOTES
Diagnosis Date Noted    Multigravida of advanced maternal age in second trimester 2024     Priority: High     Overview Note:     EDC by 7  week US not C/W LMP    PLAN:  NIPT, baby ASA 12-36 weeks, MFM for anatomy/fetal echo,  testing 32-34 weeks, delivery around 39 weeks    21:  CF, SMA, Fragile X all neg  24:  NIPT low risk, MALE  2021: CF, SMA, Fragile X neg  24 [facility]: Normal NT and nasal bone.  Genetic counseling done by MD. Low Risk NIPT, neg carrier screen. For full details review formal ultrasound report.  10/30/2024 at [facility]: Normal anatomy/echo, AC 74%, LUANA WNL, Negative NIPT. For full details review formal ultrasound report.           Assessment & Plan Note:      F/U [facility] 8-10 weeks      History of anxiety and depression 2024     Priority: Medium     Overview Note:     24 [facility]: Reports depression 2 years ago.  Saw a therapist. Doing well.  10/24/24: denies any symptoms, report good moods       Assessment & Plan Note:      Reports stable mood.      Subchorionic hemorrhage of placenta, antepartum 2024     Priority: Medium     Overview Note:     24:  1.5 x 1.4 cm -- pelvic rest  24: VB/spotting yesterday, see phone note. Today brown spotting. US today reassuring +FHT. AVTAR measuring 2.2 x .5cm. Continue pelvic rest and SAB precautions  24: resolved      Drug use affecting pregnancy in second trimester 2024     Priority: Low     Overview Note:     Pt reports using Kratom prior to NOB - encouraged immed cessation  Reports single use when thought having miscarriage. Reviewed risks based on gestational age 2024       Marginal insertion of umbilical cord affecting management of mother in second trimester 10/30/2024     Assessment & Plan Note:     Rec growth 32-34wk- offered MFM vs OB at pt preference           Full ultrasound data in ultrasound report. Limited ultrasound data included in office consult note.   Additional plans and

## 2024-11-22 ENCOUNTER — ROUTINE PRENATAL (OUTPATIENT)
Dept: OBGYN CLINIC | Age: 34
End: 2024-11-22

## 2024-11-22 VITALS
HEIGHT: 71 IN | WEIGHT: 190 LBS | SYSTOLIC BLOOD PRESSURE: 116 MMHG | BODY MASS INDEX: 26.6 KG/M2 | DIASTOLIC BLOOD PRESSURE: 74 MMHG

## 2024-11-22 DIAGNOSIS — O09.522 MULTIGRAVIDA OF ADVANCED MATERNAL AGE IN SECOND TRIMESTER: Primary | ICD-10-CM

## 2024-11-22 DIAGNOSIS — Z86.59 HISTORY OF ANXIETY: ICD-10-CM

## 2024-11-22 DIAGNOSIS — O43.192 MARGINAL INSERTION OF UMBILICAL CORD AFFECTING MANAGEMENT OF MOTHER IN SECOND TRIMESTER: ICD-10-CM

## 2024-11-22 DIAGNOSIS — O99.322 DRUG USE AFFECTING PREGNANCY IN SECOND TRIMESTER: ICD-10-CM

## 2024-11-22 DIAGNOSIS — O46.8X9 SUBCHORIONIC HEMORRHAGE OF PLACENTA, ANTEPARTUM: ICD-10-CM

## 2024-11-22 PROCEDURE — 0502F SUBSEQUENT PRENATAL CARE: CPT | Performed by: OBSTETRICS & GYNECOLOGY

## 2024-11-22 NOTE — PROGRESS NOTES
Chief Complaint   Patient presents with    Routine Prenatal Visit        This 34 y.o.  at 24w3d with Estimated Date of Delivery: 3/11/25 presents for routine prenatal visit. Patient has no complaints today. Pt reports good FM, no LOF, VB, ctx. Pt denies H/A, vision changes, abdom pain, N/V.    Vitals:    24 1022   BP: 116/74   Site: Left Upper Arm   Position: Sitting   Weight: 86.2 kg (190 lb)   Height: 1.803 m (5' 11\")        Patient Active Problem List    Diagnosis Date Noted    Marginal insertion of umbilical cord affecting management of mother in second trimester 10/30/2024     Overview Note:     PLAN:  serial growth in 3rd trimester       Assessment & Plan Note:     noted       History of anxiety and depression 2024     Overview Note:     24 [facility]: Reports depression 2 years ago.  Saw a therapist. Doing well.  10/24/24: denies any symptoms, report good moods       Assessment & Plan Note:     Stable currently      Multigravida of advanced maternal age in second trimester 2024     Overview Note:     EDC by 7  week US not C/W LMP    PLAN:  NIPT, baby ASA 12-36 weeks, MFM for anatomy/fetal echo,  testing 32-34 weeks, delivery around 39 weeks    21:  CF, SMA, Fragile X all neg  24:  NIPT low risk, MALE  2021: CF, SMA, Fragile X neg  24 [facility]: Normal NT and nasal bone.  Genetic counseling done by MD. Low Risk NIPT, neg carrier screen. For full details review formal ultrasound report.  10/30/2024 at [facility]: Normal anatomy/echo, AC 74%, LUANA WNL, Negative NIPT. For full details review formal ultrasound report.           Assessment & Plan Note:     Educated patient of signs and symptoms of  labor including but not limited to regular uterine contractions every 5-7 minutes for 1 hour, vaginal bleeding or leakage of fluid to seek immediate care.       Subchorionic hemorrhage of placenta, antepartum 2024     Overview Note:     24:  1.5 x 1.4 cm --

## 2024-11-22 NOTE — PROGRESS NOTES
Patient comes in today for routine prenatal visit. No complaints/concerns today.     Fetal Movement: Yes  Contractions: No  Vaginal Bleeding: No  Leaking Fluid: No  GI/: No    Vitals:    11/22/24 1022   BP: 116/74   Site: Left Upper Arm   Position: Sitting   Weight: 86.2 kg (190 lb)   Height: 1.803 m (5' 11\")

## 2024-12-30 ENCOUNTER — TELEPHONE (OUTPATIENT)
Dept: OBGYN CLINIC | Age: 34
End: 2024-12-30

## 2024-12-30 NOTE — TELEPHONE ENCOUNTER
Pt lvm stating she went to [facility] and they informed her she needed a growth scan follow up due to marginal cord insertion.pt was wondering if she could do it at her visit on Friday because . Called pt back, informed pt I read their last note and they wanted it done when she is 32-34wk and on Friday she will be 30w3d . Pt asked if it was something we could do in our office because it is easier for her to come to our office instated of [facility]. Informed pt I can ask [place] because I am unsure and pt stated she will ask at her visit on Friday.

## 2025-01-03 ENCOUNTER — ROUTINE PRENATAL (OUTPATIENT)
Dept: OBGYN CLINIC | Age: 35
End: 2025-01-03
Payer: COMMERCIAL

## 2025-01-03 ENCOUNTER — LAB (OUTPATIENT)
Dept: OBGYN CLINIC | Age: 35
End: 2025-01-03

## 2025-01-03 ENCOUNTER — PROCEDURE VISIT (OUTPATIENT)
Dept: OBGYN CLINIC | Age: 35
End: 2025-01-03

## 2025-01-03 VITALS
SYSTOLIC BLOOD PRESSURE: 116 MMHG | WEIGHT: 199 LBS | BODY MASS INDEX: 27.86 KG/M2 | DIASTOLIC BLOOD PRESSURE: 66 MMHG | HEIGHT: 71 IN

## 2025-01-03 DIAGNOSIS — O99.322 DRUG USE AFFECTING PREGNANCY IN SECOND TRIMESTER: ICD-10-CM

## 2025-01-03 DIAGNOSIS — O09.523 MULTIGRAVIDA OF ADVANCED MATERNAL AGE IN THIRD TRIMESTER: ICD-10-CM

## 2025-01-03 DIAGNOSIS — O09.522 MULTIGRAVIDA OF ADVANCED MATERNAL AGE IN SECOND TRIMESTER: Primary | ICD-10-CM

## 2025-01-03 DIAGNOSIS — O09.523 MULTIGRAVIDA OF ADVANCED MATERNAL AGE IN THIRD TRIMESTER: Primary | ICD-10-CM

## 2025-01-03 DIAGNOSIS — O46.8X9 SUBCHORIONIC HEMORRHAGE OF PLACENTA, ANTEPARTUM: ICD-10-CM

## 2025-01-03 DIAGNOSIS — O09.522 MULTIGRAVIDA OF ADVANCED MATERNAL AGE IN SECOND TRIMESTER: ICD-10-CM

## 2025-01-03 DIAGNOSIS — Z86.59 HISTORY OF ANXIETY: ICD-10-CM

## 2025-01-03 DIAGNOSIS — O43.192 MARGINAL INSERTION OF UMBILICAL CORD AFFECTING MANAGEMENT OF MOTHER IN SECOND TRIMESTER: ICD-10-CM

## 2025-01-03 DIAGNOSIS — O43.193 MARGINAL INSERTION OF UMBILICAL CORD AFFECTING MANAGEMENT OF MOTHER IN THIRD TRIMESTER: ICD-10-CM

## 2025-01-03 PROCEDURE — 0502F SUBSEQUENT PRENATAL CARE: CPT | Performed by: NURSE PRACTITIONER

## 2025-01-03 PROCEDURE — 90715 TDAP VACCINE 7 YRS/> IM: CPT | Performed by: NURSE PRACTITIONER

## 2025-01-03 PROCEDURE — 90471 IMMUNIZATION ADMIN: CPT | Performed by: NURSE PRACTITIONER

## 2025-01-03 RX ORDER — DOCUSATE SODIUM 100 MG/1
100 CAPSULE, LIQUID FILLED ORAL 2 TIMES DAILY PRN
COMMUNITY

## 2025-01-03 ASSESSMENT — PATIENT HEALTH QUESTIONNAIRE - PHQ9: DEPRESSION UNABLE TO ASSESS: PT REFUSES

## 2025-01-03 NOTE — ASSESSMENT & PLAN NOTE
PTL/labor precautions, FMC, and pregnancy warning signs reviewed. Pt advised to call the office at [phone] or go straight to Labor and Delivery at [facility] with any of the following concerns vaginal bleeding, leaking of fluid, yvonne regularly Q 5-7 minutes for over an hour or not feeling the baby move.   Rto 2 weeks OBV/US then start weekly OBV/US

## 2025-01-03 NOTE — PROGRESS NOTES
I have reviewed the patient's visit today including history, exam and assessment by LYNDA Rosenberg.  I agree with treatment/plan as above.    Tristen Schaffer MD  10:02 AM  01/03/25   
Patient comes in today for routine prenatal visit. No complaints/concerns today.     Draw glucola at 9:06am.     Fetal Movement: Yes  Contractions: No  Vaginal Bleeding: No  Leaking Fluid: No  GI/: No    Vitals:    01/03/25 0825   BP: 116/66   Site: Right Upper Arm   Position: Sitting   Weight: 90.3 kg (199 lb)   Height: 1.803 m (5' 11\")        
     UDS today            Orders Placed This Encounter   Procedures    IMMUNIZ ADMIN,1 SINGLE/COMB VAC/TOXOID    Tdap, BOOSTRIX, (age 10 yrs+), IM    T Pallidum Screen W/Reflex    CBC    Glucose tolerance, 1 hour    Urine Drug Screen       Outpatient Encounter Medications as of 1/3/2025   Medication Sig Dispense Refill    docusate sodium (COLACE) 100 MG capsule Take 1 capsule by mouth 2 times daily as needed      Calcium Carbonate Antacid (TUMS E-X PO) Take by mouth      aspirin (ASPIRIN ADULT LOW STRENGTH) 81 MG chewable tablet Take 2 tablets by mouth daily 60 tablet 0    Prenatal w/o A Vit-Fe Fum-FA (PRENATA PO) Take by mouth Gummies      Doxylamine Succinate, Sleep, (UNISOM PO) Take by mouth       No facility-administered encounter medications on file as of 1/3/2025.               Labor signs, pregnancy warning signs, and fetal movement counting reviewed (if applicable)        TADEO Talley - CNP 01/03/25 8:49 AM

## 2025-01-04 LAB
AMPHET UR QL SCN: NEGATIVE
BARBITURATES UR QL SCN: NEGATIVE
BENZODIAZ UR QL: NEGATIVE
CANNABINOIDS UR QL SCN: NEGATIVE
COCAINE UR QL SCN: NEGATIVE
ERYTHROCYTE [DISTWIDTH] IN BLOOD BY AUTOMATED COUNT: 12.2 % (ref 11.9–14.6)
GLUCOSE 1 HOUR: 128 MG/DL
HCT VFR BLD AUTO: 34.8 % (ref 35.8–46.3)
HGB BLD-MCNC: 11.5 G/DL (ref 11.7–15.4)
MCH RBC QN AUTO: 31.1 PG (ref 26.1–32.9)
MCHC RBC AUTO-ENTMCNC: 33 G/DL (ref 31.4–35)
MCV RBC AUTO: 94.1 FL (ref 82–102)
METHADONE UR QL: NEGATIVE
NRBC # BLD: 0 K/UL (ref 0–0.2)
OPIATES UR QL: NEGATIVE
PCP UR QL: NEGATIVE
PLATELET # BLD AUTO: 240 K/UL (ref 150–450)
PMV BLD AUTO: 11.6 FL (ref 9.4–12.3)
RBC # BLD AUTO: 3.7 M/UL (ref 4.05–5.2)
T PALLIDUM AB SER QL IA: NONREACTIVE
WBC # BLD AUTO: 10.1 K/UL (ref 4.3–11.1)

## 2025-01-12 SDOH — ECONOMIC STABILITY: FOOD INSECURITY: WITHIN THE PAST 12 MONTHS, YOU WORRIED THAT YOUR FOOD WOULD RUN OUT BEFORE YOU GOT MONEY TO BUY MORE.: NEVER TRUE

## 2025-01-12 SDOH — ECONOMIC STABILITY: FOOD INSECURITY: WITHIN THE PAST 12 MONTHS, THE FOOD YOU BOUGHT JUST DIDN'T LAST AND YOU DIDN'T HAVE MONEY TO GET MORE.: NEVER TRUE

## 2025-01-12 SDOH — ECONOMIC STABILITY: INCOME INSECURITY: IN THE LAST 12 MONTHS, WAS THERE A TIME WHEN YOU WERE NOT ABLE TO PAY THE MORTGAGE OR RENT ON TIME?: NO

## 2025-01-12 SDOH — ECONOMIC STABILITY: TRANSPORTATION INSECURITY
IN THE PAST 12 MONTHS, HAS LACK OF TRANSPORTATION KEPT YOU FROM MEETINGS, WORK, OR FROM GETTING THINGS NEEDED FOR DAILY LIVING?: NO

## 2025-01-12 SDOH — ECONOMIC STABILITY: TRANSPORTATION INSECURITY
IN THE PAST 12 MONTHS, HAS THE LACK OF TRANSPORTATION KEPT YOU FROM MEDICAL APPOINTMENTS OR FROM GETTING MEDICATIONS?: NO

## 2025-01-15 ENCOUNTER — ROUTINE PRENATAL (OUTPATIENT)
Dept: OBGYN CLINIC | Age: 35
End: 2025-01-15

## 2025-01-15 VITALS — SYSTOLIC BLOOD PRESSURE: 124 MMHG | DIASTOLIC BLOOD PRESSURE: 73 MMHG

## 2025-01-15 DIAGNOSIS — O46.8X9 SUBCHORIONIC HEMORRHAGE OF PLACENTA, ANTEPARTUM: ICD-10-CM

## 2025-01-15 DIAGNOSIS — Z3A.32 32 WEEKS GESTATION OF PREGNANCY: ICD-10-CM

## 2025-01-15 DIAGNOSIS — O43.193 MARGINAL INSERTION OF UMBILICAL CORD AFFECTING MANAGEMENT OF MOTHER IN THIRD TRIMESTER: ICD-10-CM

## 2025-01-15 DIAGNOSIS — O99.322 DRUG USE AFFECTING PREGNANCY IN SECOND TRIMESTER: ICD-10-CM

## 2025-01-15 DIAGNOSIS — Z86.59 HISTORY OF ANXIETY: ICD-10-CM

## 2025-01-15 DIAGNOSIS — O09.523 MULTIGRAVIDA OF ADVANCED MATERNAL AGE IN THIRD TRIMESTER: Primary | ICD-10-CM

## 2025-01-15 ASSESSMENT — PATIENT HEALTH QUESTIONNAIRE - PHQ9
1. LITTLE INTEREST OR PLEASURE IN DOING THINGS: NOT AT ALL
SUM OF ALL RESPONSES TO PHQ QUESTIONS 1-9: 0
SUM OF ALL RESPONSES TO PHQ9 QUESTIONS 1 & 2: 0
SUM OF ALL RESPONSES TO PHQ QUESTIONS 1-9: 0
2. FEELING DOWN, DEPRESSED OR HOPELESS: NOT AT ALL

## 2025-01-15 NOTE — PATIENT INSTRUCTIONS
Your Maternity Check List   Before Arriving to the Hospital     Find an OBGYN Doctor: [email or web address]  Maternity Pre-registration for Hospital: [email or web address]  Sign up for a Hospital Tour: [email or web address]  [place] for Prenatal Classes: www.[email or web address]-[email or web address]-events   Car seat Safety Check: https://www.safekids.org/coalition/safe-kids-[facility]   Learn More: www.[email or web address]-care-services/[email or web address]-health/maternity   Download the [employer]® Pregnancy Ashley: (Scan QR Code below):  See educational videos, track your pregnancy, and Mom/Baby Care videos

## 2025-01-15 NOTE — PROGRESS NOTES
[facility] MATERNAL FETAL MEDICINE    [address] [place] [address]  P- [phone]  B-[phone]     M Follow-up Visit  Sol Mccoy (1990) is a 34 y.o.  at 32w1d with 3/11/2025, by Ultrasound.   Presents for evaluation of the following chief complaint(s):   Chief Complaint   Patient presents with    Pregnancy Ultrasound    High Risk Pregnancy     AMA     Patient is SAHM to Geoff. Expecting baby boy.    Patient is scheduled to see Primary OB (TATE/Sarbjit) on 25. Scheduled for weekly testing at OB.     Interval history since prior appt reviewed and chart updated as indicated.    Reports HA's; 1x/wk, resolves with Tylenol. Has mild hand and BLE edema with increased sodium in diet. No edema noted today. Denies Pre-eclamptic symptoms. No bleeding or LOF.  No contractions or cramping. No vaginal pressure recently. Reports good fetal movement.     Doing well overall. Concerns addressed.     Mood evaluated today based on discussion with pt and PHQ screen.       1/15/2025     9:38 AM   PHQ-9    Little interest or pleasure in doing things 0   Feeling down, depressed, or hopeless 0   PHQ-2 Score 0   PHQ-9 Total Score 0      Mood Reassuring today  Recommend lifestyle/behavioral modifications to enhance mood (exercise, sunshine, mood apps, nutritional modifications, etc).   As mood stable and depression/anxiety well-controlled, will not begin medications today.    Reviewed gestational age precautions and activity goals/limitations  Nutritional counseling as well as specific goals based on current maternal and fetal status    Addressed normal pregnancy complaints, reassured and offered suggestions for care  Options for GERD, constipation, other common complaints reviewed    Reviewed gestational age appropriate preventive care regarding communicable disease transmission and vaccines as appropriate (including flu, TDaP >28wk, RSV 32-36wk (-), as well as, COVID.)  All questions

## 2025-01-22 ENCOUNTER — ROUTINE PRENATAL (OUTPATIENT)
Dept: OBGYN CLINIC | Age: 35
End: 2025-01-22

## 2025-01-22 ENCOUNTER — PROCEDURE VISIT (OUTPATIENT)
Dept: OBGYN CLINIC | Age: 35
End: 2025-01-22
Payer: COMMERCIAL

## 2025-01-22 VITALS
HEIGHT: 71 IN | DIASTOLIC BLOOD PRESSURE: 68 MMHG | WEIGHT: 199 LBS | BODY MASS INDEX: 27.86 KG/M2 | SYSTOLIC BLOOD PRESSURE: 124 MMHG

## 2025-01-22 DIAGNOSIS — O09.523 MULTIGRAVIDA OF ADVANCED MATERNAL AGE IN THIRD TRIMESTER: Primary | ICD-10-CM

## 2025-01-22 DIAGNOSIS — O43.193 MARGINAL INSERTION OF UMBILICAL CORD AFFECTING MANAGEMENT OF MOTHER IN THIRD TRIMESTER: ICD-10-CM

## 2025-01-22 PROCEDURE — 76819 FETAL BIOPHYS PROFIL W/O NST: CPT | Performed by: OBSTETRICS & GYNECOLOGY

## 2025-01-22 PROCEDURE — 0502F SUBSEQUENT PRENATAL CARE: CPT | Performed by: NURSE PRACTITIONER

## 2025-01-22 NOTE — PROGRESS NOTES
I have reviewed the patient's visit today including history, exam and assessment by LYNDA Rosenberg.  I agree with treatment/plan as above.    Tristen Schaffer MD  10:16 AM  01/22/25

## 2025-01-22 NOTE — PROGRESS NOTES
Patient comes in today for routine prenatal visit. No complaints/concerns today.     Patient got her RSV vaccine on 1/20/25 at a [facility] pharmacy.     Fetal Movement: Yes  Contractions: No  Vaginal Bleeding: No  Leaking Fluid: No  GI/: No    Vitals:    01/22/25 0901   BP: 124/68   Site: Right Upper Arm   Position: Sitting   Weight: 90.3 kg (199 lb)   Height: 1.803 m (5' 11\")

## 2025-01-22 NOTE — PROGRESS NOTES
This is a 34 y.o.   at 33w1d for routine OB visit.    Her Estimated Due Date is 3/11/2025, by Ultrasound    Denies leaking of fluid, vaginal bleeding, or regular contractions. Reports fetal movement.     Asks if body deodorant ok to use, pt advised yes    Current Outpatient Medications on File Prior to Visit   Medication Sig Dispense Refill    docusate sodium (COLACE) 100 MG capsule Take 1 capsule by mouth 2 times daily as needed      Calcium Carbonate Antacid (TUMS E-X PO) Take by mouth      aspirin (ASPIRIN ADULT LOW STRENGTH) 81 MG chewable tablet Take 2 tablets by mouth daily 60 tablet 0    Prenatal w/o A Vit-Fe Fum-FA (PRENATA PO) Take by mouth Gummies      Doxylamine Succinate, Sleep, (UNISOM PO) Take by mouth       No current facility-administered medications on file prior to visit.       Allergies   Allergen Reactions    Shrimp (Diagnostic) Itching, Palpitations, Shortness Of Breath and Swelling       OB History    Para Term  AB Living   2 1 1 0 0 1   SAB IAB Ectopic Molar Multiple Live Births   0 0 0 0 0 1       # 1 - Date: 21, Sex: Male, Weight: 2.835 kg (6 lb 4 oz), GA: 40w0d, Type: Vaginal, Spontaneous, Apgar1: 9, Apgar5: 9, Living: Living, Birth Comments: None    # 2 - Date: None, Sex: None, Weight: None, GA: None, Type: None, Apgar1: None, Apgar5: None, Living: None, Birth Comments: None        Past Medical History:   Diagnosis Date    ADD (attention deficit disorder)     Anxiety and depression 2019    Medication use agreement signed 19  Xanax only prn. Mainly for flying.   Doesn't want to see therapist currently bc previous psychologist  suddenly at a young age and she is nervous to get connected to someone again.      Diagnosed in college. Was also told she had ADD but pt doesn't believe that, doesn't take any meds.      Flying phobia 2019    H/O cold sores     no hx of genital lesions    Mixed hyperlipidemia 2019    Myopia 2020

## 2025-01-28 ENCOUNTER — PROCEDURE VISIT (OUTPATIENT)
Dept: OBGYN CLINIC | Age: 35
End: 2025-01-28
Payer: COMMERCIAL

## 2025-01-28 ENCOUNTER — ROUTINE PRENATAL (OUTPATIENT)
Dept: OBGYN CLINIC | Age: 35
End: 2025-01-28

## 2025-01-28 VITALS
BODY MASS INDEX: 28.28 KG/M2 | SYSTOLIC BLOOD PRESSURE: 110 MMHG | DIASTOLIC BLOOD PRESSURE: 72 MMHG | WEIGHT: 202 LBS | HEIGHT: 71 IN

## 2025-01-28 DIAGNOSIS — O09.523 MULTIGRAVIDA OF ADVANCED MATERNAL AGE IN THIRD TRIMESTER: Primary | ICD-10-CM

## 2025-01-28 DIAGNOSIS — O99.322 DRUG USE AFFECTING PREGNANCY IN SECOND TRIMESTER: ICD-10-CM

## 2025-01-28 DIAGNOSIS — O43.193 MARGINAL INSERTION OF UMBILICAL CORD AFFECTING MANAGEMENT OF MOTHER IN THIRD TRIMESTER: ICD-10-CM

## 2025-01-28 DIAGNOSIS — Z86.59 HISTORY OF ANXIETY: ICD-10-CM

## 2025-01-28 PROCEDURE — 76819 FETAL BIOPHYS PROFIL W/O NST: CPT | Performed by: OBSTETRICS & GYNECOLOGY

## 2025-01-28 PROCEDURE — 0502F SUBSEQUENT PRENATAL CARE: CPT | Performed by: OBSTETRICS & GYNECOLOGY

## 2025-01-28 NOTE — PROGRESS NOTES
Patient comes in today for routine prenatal visit. No complaints/concerns today.     Fetal Movement: Yes  Contractions: No, irregular cramping since yesterday afternoon  Vaginal Bleeding: No  Leaking Fluid: No  GI/: No    Vitals:    01/28/25 1509   BP: 110/72   Site: Left Upper Arm   Position: Sitting   Weight: 91.6 kg (202 lb)   Height: 1.803 m (5' 11\")      
Note:     Educated patient of signs and symptoms of  labor including but not limited to regular uterine contractions every 5-7 minutes for 1 hour, vaginal bleeding or leakage of fluid to seek immediate care.       Drug use affecting pregnancy in second trimester 2024     Overview Note:     Pt reports using Kratom prior to NOB - encouraged immed cessation  Reports single use when thought having miscarriage. Reviewed risks based on gestational age 2024: UDS negative       Assessment & Plan Note:     noted        Problem List Items Addressed This Visit              Multigravida of advanced maternal age in third trimester - Primary     Educated patient of signs and symptoms of  labor including but not limited to regular uterine contractions every 5-7 minutes for 1 hour, vaginal bleeding or leakage of fluid to seek immediate care.          Marginal insertion of umbilical cord affecting management of mother in third trimester     noted         History of anxiety and depression     Stable currently         Drug use affecting pregnancy in second trimester     noted             Tristen Schaffer MD   3:22 PM  25

## 2025-02-04 ENCOUNTER — PROCEDURE VISIT (OUTPATIENT)
Dept: OBGYN CLINIC | Age: 35
End: 2025-02-04
Payer: COMMERCIAL

## 2025-02-04 ENCOUNTER — ROUTINE PRENATAL (OUTPATIENT)
Dept: OBGYN CLINIC | Age: 35
End: 2025-02-04

## 2025-02-04 VITALS
WEIGHT: 202 LBS | BODY MASS INDEX: 28.28 KG/M2 | DIASTOLIC BLOOD PRESSURE: 70 MMHG | HEIGHT: 71 IN | SYSTOLIC BLOOD PRESSURE: 110 MMHG

## 2025-02-04 DIAGNOSIS — O99.322 DRUG USE AFFECTING PREGNANCY IN SECOND TRIMESTER: ICD-10-CM

## 2025-02-04 DIAGNOSIS — O09.523 AMA (ADVANCED MATERNAL AGE) MULTIGRAVIDA 35+, THIRD TRIMESTER: Primary | ICD-10-CM

## 2025-02-04 DIAGNOSIS — O09.523 MULTIGRAVIDA OF ADVANCED MATERNAL AGE IN THIRD TRIMESTER: ICD-10-CM

## 2025-02-04 DIAGNOSIS — O09.523 MULTIGRAVIDA OF ADVANCED MATERNAL AGE IN THIRD TRIMESTER: Primary | ICD-10-CM

## 2025-02-04 DIAGNOSIS — O43.193 MARGINAL INSERTION OF UMBILICAL CORD AFFECTING MANAGEMENT OF MOTHER IN THIRD TRIMESTER: ICD-10-CM

## 2025-02-04 DIAGNOSIS — Z86.59 HISTORY OF ANXIETY: ICD-10-CM

## 2025-02-04 PROCEDURE — 76816 OB US FOLLOW-UP PER FETUS: CPT | Performed by: OBSTETRICS & GYNECOLOGY

## 2025-02-04 PROCEDURE — 0502F SUBSEQUENT PRENATAL CARE: CPT | Performed by: NURSE PRACTITIONER

## 2025-02-04 PROCEDURE — 76819 FETAL BIOPHYS PROFIL W/O NST: CPT | Performed by: OBSTETRICS & GYNECOLOGY

## 2025-02-04 NOTE — PROGRESS NOTES
This is a 34 y.o.   at 35w0d for routine OB visit.    Her Estimated Due Date is 3/11/2025, by Ultrasound    Denies leaking of fluid, vaginal bleeding, or regular contractions. Reports fetal movement.     Current Outpatient Medications on File Prior to Visit   Medication Sig Dispense Refill    docusate sodium (COLACE) 100 MG capsule Take 1 capsule by mouth 2 times daily as needed      Calcium Carbonate Antacid (TUMS E-X PO) Take by mouth      aspirin (ASPIRIN ADULT LOW STRENGTH) 81 MG chewable tablet Take 2 tablets by mouth daily 60 tablet 0    Prenatal w/o A Vit-Fe Fum-FA (PRENATA PO) Take by mouth Gummies      Doxylamine Succinate, Sleep, (UNISOM PO) Take by mouth       No current facility-administered medications on file prior to visit.       Allergies   Allergen Reactions    Shrimp (Diagnostic) Itching, Palpitations, Shortness Of Breath and Swelling       OB History    Para Term  AB Living   2 1 1 0 0 1   SAB IAB Ectopic Molar Multiple Live Births   0 0 0 0 0 1       # 1 - Date: 21, Sex: Male, Weight: 2.835 kg (6 lb 4 oz), GA: 40w0d, Type: Vaginal, Spontaneous, Apgar1: 9, Apgar5: 9, Living: Living, Birth Comments: None    # 2 - Date: None, Sex: None, Weight: None, GA: None, Type: None, Apgar1: None, Apgar5: None, Living: None, Birth Comments: None        Past Medical History:   Diagnosis Date    ADD (attention deficit disorder)     Anxiety and depression 2019    Medication use agreement signed 19  Xanax only prn. Mainly for flying.   Doesn't want to see therapist currently bc previous psychologist  suddenly at a young age and she is nervous to get connected to someone again.      Diagnosed in college. Was also told she had ADD but pt doesn't believe that, doesn't take any meds.      Flying phobia 2019    H/O cold sores     no hx of genital lesions    Mixed hyperlipidemia 2019    Myopia 2020    Psychiatric problem     depression    Subchorionic hemorrhage

## 2025-02-04 NOTE — PROGRESS NOTES
Patient comes in today for routine prenatal visit. No complaints/concerns today.     Fetal Movement: Yes  Contractions: No  Vaginal Bleeding: No  Leaking Fluid: No  GI/: No    Vitals:    02/04/25 0848   BP: 110/70   Site: Right Upper Arm   Position: Sitting   Weight: 91.6 kg (202 lb)   Height: 1.803 m (5' 11\")

## 2025-02-04 NOTE — PROGRESS NOTES
I have reviewed the patient's visit today including history, exam and assessment by LYNDA Rosenberg.  I agree with treatment/plan as above.    Tristen Schaffer MD  10:22 AM  02/04/25

## 2025-02-11 ENCOUNTER — PROCEDURE VISIT (OUTPATIENT)
Dept: OBGYN CLINIC | Age: 35
End: 2025-02-11
Payer: COMMERCIAL

## 2025-02-11 ENCOUNTER — ROUTINE PRENATAL (OUTPATIENT)
Dept: OBGYN CLINIC | Age: 35
End: 2025-02-11

## 2025-02-11 VITALS
BODY MASS INDEX: 28.56 KG/M2 | DIASTOLIC BLOOD PRESSURE: 76 MMHG | SYSTOLIC BLOOD PRESSURE: 112 MMHG | WEIGHT: 204 LBS | HEIGHT: 71 IN

## 2025-02-11 DIAGNOSIS — O36.63X0 EXCESSIVE FETAL GROWTH AFFECTING MANAGEMENT OF PREGNANCY IN THIRD TRIMESTER, SINGLE OR UNSPECIFIED FETUS: ICD-10-CM

## 2025-02-11 DIAGNOSIS — O99.322 DRUG USE AFFECTING PREGNANCY IN SECOND TRIMESTER: ICD-10-CM

## 2025-02-11 DIAGNOSIS — Z86.59 HISTORY OF ANXIETY: ICD-10-CM

## 2025-02-11 DIAGNOSIS — O43.193 MARGINAL INSERTION OF UMBILICAL CORD AFFECTING MANAGEMENT OF MOTHER IN THIRD TRIMESTER: ICD-10-CM

## 2025-02-11 DIAGNOSIS — O09.523 MULTIGRAVIDA OF ADVANCED MATERNAL AGE IN THIRD TRIMESTER: Primary | ICD-10-CM

## 2025-02-11 PROCEDURE — 0502F SUBSEQUENT PRENATAL CARE: CPT | Performed by: OBSTETRICS & GYNECOLOGY

## 2025-02-11 PROCEDURE — 76819 FETAL BIOPHYS PROFIL W/O NST: CPT | Performed by: OBSTETRICS & GYNECOLOGY

## 2025-02-11 NOTE — PROGRESS NOTES
Chaperone for Intimate Exam     Chaperone was offer accepted as part of the rooming process    Chaperone: Luz Gilman    
Chief Complaint   Patient presents with    Routine Prenatal Visit    Pregnancy Ultrasound        This 34 y.o.  at 36w0d with Estimated Date of Delivery: 3/11/25 presents for routine prenatal visit. Patient has no complaints today. Pt reports good FM, no LOF, VB, ctx. Pt denies H/A, vision changes, abdom pain, N/V.    Vitals:    25 0859   BP: 112/76   Site: Left Upper Arm   Position: Sitting   Weight: 92.5 kg (204 lb)   Height: 1.803 m (5' 11\")        Patient Active Problem List    Diagnosis Date Noted    Excessive fetal growth affecting management of pregnancy in third trimester 2025     Overview Note:     2025: EFW 97%, AC 78%, LUANA nl, vertex, BPP 8/8. S/D ratio 1.3 -- carb limitation D/W pt       Assessment & Plan Note:     D/W pt to attempt to limit carbohydrate intake for the remainder of pregnancy to help reduce potential excessive growth and/or LUANA.  D/W her that this includes any with sugar in it (sweets, desserts, etc.), breads, potatoes, rice and pasta.       Marginal insertion of umbilical cord affecting management of mother in third trimester 10/30/2024     Overview Note:     PLAN:  serial growth in 3rd trimester    1/3/2025: EFW 81%, AC 72%, LUANA nl, vertex. Tdap given   2025: EFW 97%, AC 78%, LUANA nl, vertex, BPP 8/8. S/D ratio 1.3       Assessment & Plan Note:     noted      History of anxiety and depression 2024     Overview Note:     24 [facility]: Reports depression 2 years ago.  Saw a therapist. Doing well.  10/24/24: denies any symptoms, report good moods  1/3/2025: denies any symptoms, report good moods       Assessment & Plan Note:     Stable currently       Multigravida of advanced maternal age in third trimester 2024     Overview Note:     EDC by 7 2/7 week US not C/W LMP    PLAN:  NIPT, baby ASA 12-36 weeks, MFM for anatomy/fetal echo,  testing 32-34 weeks, delivery around 39 weeks    21:  CF, SMA, Fragile X all neg  24:  NIPT low risk, 
Patient comes in today for routine prenatal visit. No complaints/concerns today.     Fetal Movement: Yes  Contractions: No  Vaginal Bleeding: No  Leaking Fluid: No  GI/: No    Vitals:    02/11/25 0859   BP: 112/76   Site: Left Upper Arm   Position: Sitting   Weight: 92.5 kg (204 lb)   Height: 1.803 m (5' 11\")        
English

## 2025-02-11 NOTE — ASSESSMENT & PLAN NOTE
D/W pt to attempt to limit carbohydrate intake for the remainder of pregnancy to help reduce potential excessive growth and/or LUANA.  D/W her that this includes any with sugar in it (sweets, desserts, etc.), breads, potatoes, rice and pasta.

## 2025-02-14 PROBLEM — O99.820 GBS (GROUP B STREPTOCOCCUS CARRIER), +RV CULTURE, CURRENTLY PREGNANT: Status: ACTIVE | Noted: 2025-02-14

## 2025-02-14 LAB
BACTERIA SPEC CULT: ABNORMAL
SERVICE CMNT-IMP: ABNORMAL

## 2025-02-18 ENCOUNTER — ROUTINE PRENATAL (OUTPATIENT)
Dept: OBGYN CLINIC | Age: 35
End: 2025-02-18

## 2025-02-18 ENCOUNTER — PROCEDURE VISIT (OUTPATIENT)
Dept: OBGYN CLINIC | Age: 35
End: 2025-02-18
Payer: COMMERCIAL

## 2025-02-18 VITALS
SYSTOLIC BLOOD PRESSURE: 116 MMHG | HEIGHT: 71 IN | BODY MASS INDEX: 28.7 KG/M2 | WEIGHT: 205 LBS | DIASTOLIC BLOOD PRESSURE: 80 MMHG

## 2025-02-18 DIAGNOSIS — O43.193 MARGINAL INSERTION OF UMBILICAL CORD AFFECTING MANAGEMENT OF MOTHER IN THIRD TRIMESTER: ICD-10-CM

## 2025-02-18 DIAGNOSIS — O09.523 MULTIGRAVIDA OF ADVANCED MATERNAL AGE IN THIRD TRIMESTER: Primary | ICD-10-CM

## 2025-02-18 DIAGNOSIS — O36.63X0 EXCESSIVE FETAL GROWTH AFFECTING MANAGEMENT OF PREGNANCY IN THIRD TRIMESTER, SINGLE OR UNSPECIFIED FETUS: ICD-10-CM

## 2025-02-18 DIAGNOSIS — Z86.59 HISTORY OF ANXIETY: ICD-10-CM

## 2025-02-18 DIAGNOSIS — O99.820 GBS (GROUP B STREPTOCOCCUS CARRIER), +RV CULTURE, CURRENTLY PREGNANT: ICD-10-CM

## 2025-02-18 DIAGNOSIS — O99.322 DRUG USE AFFECTING PREGNANCY IN SECOND TRIMESTER: ICD-10-CM

## 2025-02-18 PROCEDURE — 76819 FETAL BIOPHYS PROFIL W/O NST: CPT | Performed by: OBSTETRICS & GYNECOLOGY

## 2025-02-18 PROCEDURE — 0502F SUBSEQUENT PRENATAL CARE: CPT | Performed by: NURSE PRACTITIONER

## 2025-02-18 NOTE — ASSESSMENT & PLAN NOTE
PTL/labor precautions, FMC, and pregnancy warning signs reviewed. Pt advised to call the office at [phone] or go straight to Labor and Delivery at [facility] [place] with any of the following concerns vaginal bleeding, leaking of fluid, yvonne regularly Q 5-7 minutes for over an hour or not feeling the baby move.   Rto 1 wk OBV

## 2025-02-18 NOTE — PROGRESS NOTES
This is a 34 y.o.   at 37w0d for routine OB visit.    Her Estimated Due Date is 3/11/2025, by Ultrasound    Denies leaking of fluid, vaginal bleeding, or regular contractions. Reports fetal movement, just not as strong but baby passes FKCs daily    Current Outpatient Medications on File Prior to Visit   Medication Sig Dispense Refill    docusate sodium (COLACE) 100 MG capsule Take 1 capsule by mouth 2 times daily as needed      Calcium Carbonate Antacid (TUMS E-X PO) Take by mouth      Prenatal w/o A Vit-Fe Fum-FA (PRENATA PO) Take by mouth Gummies      Doxylamine Succinate, Sleep, (UNISOM PO) Take by mouth       No current facility-administered medications on file prior to visit.       Allergies   Allergen Reactions    Shrimp (Diagnostic) Itching, Palpitations, Shortness Of Breath and Swelling       OB History    Para Term  AB Living   2 1 1 0 0 1   SAB IAB Ectopic Molar Multiple Live Births   0 0 0 0 0 1       # 1 - Date: 21, Sex: Male, Weight: 2.835 kg (6 lb 4 oz), GA: 40w0d, Type: Vaginal, Spontaneous, Apgar1: 9, Apgar5: 9, Living: Living, Birth Comments: None    # 2 - Date: None, Sex: None, Weight: None, GA: None, Type: None, Apgar1: None, Apgar5: None, Living: None, Birth Comments: None        Past Medical History:   Diagnosis Date    ADD (attention deficit disorder)     Anxiety and depression 2019    Medication use agreement signed 19  Xanax only prn. Mainly for flying.   Doesn't want to see therapist currently bc previous psychologist  suddenly at a young age and she is nervous to get connected to someone again.      Diagnosed in college. Was also told she had ADD but pt doesn't believe that, doesn't take any meds.      Flying phobia 2019    H/O cold sores     no hx of genital lesions    Mixed hyperlipidemia 2019    Myopia 2020    Psychiatric problem     depression    Subchorionic hemorrhage of placenta, antepartum 2024:  1.5 x 1.4

## 2025-02-18 NOTE — PROGRESS NOTES
I have reviewed the patient's visit today including history, exam and assessment by LYNDA Rosenberg.  I agree with treatment/plan as above.    Tristen Schaffer MD  10:18 AM  02/18/25

## 2025-02-18 NOTE — PROGRESS NOTES
Patient comes in today for routine prenatal visit. No complaints/concerns today.     Fetal Movement: Yes - moves consistently, but not as strong as before   Contractions: No  Vaginal Bleeding: No  Leaking Fluid: No  GI/: No    Vitals:    02/18/25 0915   BP: 116/80   Site: Left Upper Arm   Position: Sitting   Weight: 93 kg (205 lb)   Height: 1.803 m (5' 11\")

## 2025-02-20 ENCOUNTER — HOSPITAL ENCOUNTER (OUTPATIENT)
Age: 35
Discharge: HOME | End: 2025-02-20
Attending: OBSTETRICS & GYNECOLOGY | Admitting: OBSTETRICS & GYNECOLOGY
Payer: COMMERCIAL

## 2025-02-20 VITALS
TEMPERATURE: 97.8 F | OXYGEN SATURATION: 97 % | SYSTOLIC BLOOD PRESSURE: 136 MMHG | HEART RATE: 84 BPM | DIASTOLIC BLOOD PRESSURE: 73 MMHG | RESPIRATION RATE: 16 BRPM
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
## 2025-02-20 PROCEDURE — 99283 EMERGENCY DEPT VISIT LOW MDM: CPT
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
## 2025-02-21 NOTE — DISCHARGE INSTRUCTIONS
PLEASE FOLLOW-UP WITH PRIMARY OB AT NEXT SCHEDULED VISIT. RETURN TO A [facility] IF EXPERIENCING CONTRACTIONS THAT BECOME STRONGER AND CLOSER TOGETHER, VAGINAL BLEEDING, LEAKING OF FLUIDS, OR DECREASED FETAL MOVEMENT.         Counting Your Baby's Kicks: Care Instructions  Overview     Counting your baby's kicks is one way your doctor can tell that your baby is healthy. You will probably feel your baby move for the first time between 16 and 22 weeks. The movement may feel like flutters rather than kicks. Your baby may move more at certain times of the day. When you are active, you may notice less kicking than when you are resting. At your prenatal visits, your doctor will ask whether the baby is active.  In your last trimester, your doctor may ask you to count the number of times you feel your baby move.  Follow-up care is a key part of your treatment and safety. Be sure to make and go to all appointments, and call your doctor if you are having problems. It's also a good idea to know your test results and keep a list of the medicines you take.  How do you count fetal kicks?  A common method of checking your baby's movement is to note the length of time it takes to count 10 movements (such as kicks, flutters, or rolls).  Pick your baby's most active time of day to count. This may be any time from morning to evening.  If you don't feel 10 movements in an hour, have something to eat or drink and count for another hour. If you don't feel at least 10 movements in the 2-hour period, call your doctor.  Do not use an at-home Doppler heart monitor in place of counting fetal movements.  When should you call for help?   Call your doctor now or seek immediate medical care if:    You feel fewer than 10 movements in a 2-hour period.     You noticed that your baby has stopped moving or is moving less than normal.   Watch closely for changes in your health, and be sure to contact your doctor if you have any problems.  Where can you  learn more?  Go to https://www.Y[employer].net/patientEd and enter U048 to learn more about \"Counting Your Baby's Kicks: Care Instructions.\"  Current as of: 2024  Content Version: 14.3  2024 [employer].   Care instructions adapted under license by [employer]. If you have questions about a medical condition or this instruction, always ask your healthcare professional. [employer] disclaims any warranty or liability for your use of this information.       Pregnancy Precautions: Care Instructions  Overview     There is no sure way to prevent labor before your due date ( labor) or to prevent most other pregnancy problems. But there are things you can do to increase your chances of a healthy pregnancy. Go to your appointments, follow your doctor's advice, and take good care of yourself. Eat healthy foods, and exercise (if your doctor agrees). And make sure to drink plenty of water.  Follow-up care is a key part of your treatment and safety. Be sure to make and go to all appointments, and call your doctor if you are having problems. It's also a good idea to know your test results and keep a list of the medicines you take.  How can you care for yourself at home?  Make sure you go to your prenatal appointments. At each visit, your doctor will check your blood pressure and weight. Your doctor will also listen for a fetal heartbeat and measure the size of the uterus.  Drink plenty of fluids. Dehydration can cause contractions. If you have kidney, heart, or liver disease and have to limit fluids, talk with your doctor before you increase the amount of fluids you drink.  Tell your doctor right away if you notice any symptoms of an infection, such as:  Burning when you urinate.  A frequent need to urinate without being able to pass much urine.  A foul-smelling discharge from your vagina.  Vaginal itching.  Unexplained fever.  Unusual pain or soreness in your uterus or lower

## 2025-02-21 NOTE — PROGRESS NOTES
Discharge orders received from MD. Discharge instructions given to pt and pt verbalized understanding. D/c home.

## 2025-02-25 ENCOUNTER — PROCEDURE VISIT (OUTPATIENT)
Dept: OBGYN CLINIC | Age: 35
End: 2025-02-25
Payer: COMMERCIAL

## 2025-02-25 ENCOUNTER — PREP FOR PROCEDURE (OUTPATIENT)
Dept: OBGYN CLINIC | Age: 35
End: 2025-02-25

## 2025-02-25 ENCOUNTER — ROUTINE PRENATAL (OUTPATIENT)
Dept: OBGYN CLINIC | Age: 35
End: 2025-02-25

## 2025-02-25 VITALS
WEIGHT: 204 LBS | HEIGHT: 71 IN | BODY MASS INDEX: 28.56 KG/M2 | DIASTOLIC BLOOD PRESSURE: 68 MMHG | SYSTOLIC BLOOD PRESSURE: 126 MMHG

## 2025-02-25 DIAGNOSIS — Z86.59 HISTORY OF ANXIETY: ICD-10-CM

## 2025-02-25 DIAGNOSIS — O99.820 GBS (GROUP B STREPTOCOCCUS CARRIER), +RV CULTURE, CURRENTLY PREGNANT: ICD-10-CM

## 2025-02-25 DIAGNOSIS — O36.63X0 EXCESSIVE FETAL GROWTH AFFECTING MANAGEMENT OF PREGNANCY IN THIRD TRIMESTER, SINGLE OR UNSPECIFIED FETUS: ICD-10-CM

## 2025-02-25 DIAGNOSIS — O09.523 MULTIGRAVIDA OF ADVANCED MATERNAL AGE IN THIRD TRIMESTER: ICD-10-CM

## 2025-02-25 DIAGNOSIS — O99.322 DRUG USE AFFECTING PREGNANCY IN SECOND TRIMESTER: ICD-10-CM

## 2025-02-25 DIAGNOSIS — O43.193 MARGINAL INSERTION OF UMBILICAL CORD AFFECTING MANAGEMENT OF MOTHER IN THIRD TRIMESTER: ICD-10-CM

## 2025-02-25 DIAGNOSIS — O43.193 MARGINAL INSERTION OF UMBILICAL CORD AFFECTING MANAGEMENT OF MOTHER IN THIRD TRIMESTER: Primary | ICD-10-CM

## 2025-02-25 DIAGNOSIS — O09.523 MULTIGRAVIDA OF ADVANCED MATERNAL AGE IN THIRD TRIMESTER: Primary | ICD-10-CM

## 2025-02-25 PROCEDURE — 76816 OB US FOLLOW-UP PER FETUS: CPT | Performed by: OBSTETRICS & GYNECOLOGY

## 2025-02-25 PROCEDURE — 76819 FETAL BIOPHYS PROFIL W/O NST: CPT | Performed by: OBSTETRICS & GYNECOLOGY

## 2025-02-25 PROCEDURE — 0502F SUBSEQUENT PRENATAL CARE: CPT | Performed by: OBSTETRICS & GYNECOLOGY

## 2025-02-25 RX ORDER — SODIUM CHLORIDE 0.9 % (FLUSH) 0.9 %
5-40 SYRINGE (ML) INJECTION EVERY 12 HOURS SCHEDULED
Status: CANCELLED | OUTPATIENT
Start: 2025-02-25

## 2025-02-25 RX ORDER — TERBUTALINE SULFATE 1 MG/ML
0.25 INJECTION SUBCUTANEOUS ONCE
Status: CANCELLED | OUTPATIENT
Start: 2025-02-25 | End: 2025-02-25

## 2025-02-25 RX ORDER — SODIUM CHLORIDE, SODIUM LACTATE, POTASSIUM CHLORIDE, AND CALCIUM CHLORIDE .6; .31; .03; .02 G/100ML; G/100ML; G/100ML; G/100ML
1000 INJECTION, SOLUTION INTRAVENOUS PRN
Status: CANCELLED | OUTPATIENT
Start: 2025-02-25

## 2025-02-25 RX ORDER — DEXTROSE, SODIUM CHLORIDE, SODIUM LACTATE, POTASSIUM CHLORIDE, AND CALCIUM CHLORIDE 5; .6; .31; .03; .02 G/100ML; G/100ML; G/100ML; G/100ML; G/100ML
INJECTION, SOLUTION INTRAVENOUS CONTINUOUS
Status: CANCELLED | OUTPATIENT
Start: 2025-02-25

## 2025-02-25 RX ORDER — SODIUM CHLORIDE 0.9 % (FLUSH) 0.9 %
5-40 SYRINGE (ML) INJECTION PRN
Status: CANCELLED | OUTPATIENT
Start: 2025-02-25

## 2025-02-25 RX ORDER — MISOPROSTOL 100 UG/1
400 TABLET ORAL PRN
Status: CANCELLED | OUTPATIENT
Start: 2025-02-25

## 2025-02-25 RX ORDER — ONDANSETRON 4 MG/1
4 TABLET, ORALLY DISINTEGRATING ORAL EVERY 6 HOURS PRN
Status: CANCELLED | OUTPATIENT
Start: 2025-02-25

## 2025-02-25 RX ORDER — METHYLERGONOVINE MALEATE 0.2 MG/ML
200 INJECTION INTRAVENOUS PRN
Status: CANCELLED | OUTPATIENT
Start: 2025-02-25

## 2025-02-25 RX ORDER — SODIUM CHLORIDE, SODIUM LACTATE, POTASSIUM CHLORIDE, AND CALCIUM CHLORIDE .6; .31; .03; .02 G/100ML; G/100ML; G/100ML; G/100ML
500 INJECTION, SOLUTION INTRAVENOUS PRN
Status: CANCELLED | OUTPATIENT
Start: 2025-02-25

## 2025-02-25 RX ORDER — ONDANSETRON 2 MG/ML
4 INJECTION INTRAMUSCULAR; INTRAVENOUS EVERY 6 HOURS PRN
Status: CANCELLED | OUTPATIENT
Start: 2025-02-25

## 2025-02-25 RX ORDER — SODIUM CHLORIDE 9 MG/ML
INJECTION, SOLUTION INTRAVENOUS PRN
Status: CANCELLED | OUTPATIENT
Start: 2025-02-25

## 2025-02-25 NOTE — PROGRESS NOTES
Chief Complaint   Patient presents with    Routine Prenatal Visit    Ultrasound        This 34 y.o.  at 38w0d with Estimated Date of Delivery: 3/11/25 presents for routine prenatal visit. Patient has no complaints today. Pt reports good FM, no LOF, VB, ctx. Pt denies H/A, vision changes, abdom pain, N/V.    Vitals:    25 0854   BP: 126/68   Site: Right Upper Arm   Position: Sitting   Weight: 92.5 kg (204 lb)   Height: 1.803 m (5' 11\")        Patient Active Problem List    Diagnosis Date Noted    GBS (group B Streptococcus carrier), +RV culture, currently pregnant 2025     Overview Note:     Treat in labor       Assessment & Plan Note:     noted      Excessive fetal growth affecting management of pregnancy in third trimester 2025     Overview Note:     2025: EFW 97%, AC 78%, LUANA nl, vertex, BPP 8/8. S/D ratio 1.3 -- carb limitation D/W pt  25:  EFW 81%, AC 65%, LUANA 15.9 cm, BPP 8/8, vtx        Assessment & Plan Note:     D/W pt to attempt to limit carbohydrate intake for the remainder of pregnancy to help reduce potential excessive growth and/or LUANA.  D/W her that this includes any with sugar in it (sweets, desserts, etc.), breads, potatoes, rice and pasta.       Marginal insertion of umbilical cord affecting management of mother in third trimester 10/30/2024     Overview Note:     PLAN:  serial growth in 3rd trimester    1/3/2025: EFW 81%, AC 72%, LUANA nl, vertex. Tdap given   2025: EFW 97%, AC 78%, LUANA nl, vertex, BPP 8/8. S/D ratio 1.3  25:  EFW 81%, AC 65%, LUANA 15.9 cm, BPP 8/8, vtx       Assessment & Plan Note:     noted      History of anxiety and depression 2024     Overview Note:     24 [facility]: Reports depression 2 years ago.  Saw a therapist. Doing well.  10/24/24: denies any symptoms, report good moods  1/3/2025: denies any symptoms, report good moods       Assessment & Plan Note:     Stable currently      Multigravida of advanced maternal age in third

## 2025-02-25 NOTE — PATIENT INSTRUCTIONS
Please limit your carbohydrate intake for the remainder of pregnancy.  This includes any with sugar in it (sweets, desserts, etc.), breads, potatoes, rice and pasta.  This will help with your babies growth and/or fluid.   If you develop signs and symptoms of labor including but not limited to regular uterine contractions every 5-7 minutes for 1 hour, vaginal bleeding or leakage of fluid please contact our office and/or seek immediate care.  Thanks for coming to see us today and letting us take care of you!

## 2025-02-25 NOTE — PROGRESS NOTES
Patient comes in today for routine prenatal visit. No complaints/concerns today.     Chaperone for Intimate Exam     Chaperone was offer accepted as part of the rooming process    Chaperone: Keisha Rolon       Fetal Movement: Yes  Contractions: No  Vaginal Bleeding: No  Leaking Fluid: No  GI/: No    Vitals:    02/25/25 0854   BP: 126/68   Site: Right Upper Arm   Position: Sitting   Weight: 92.5 kg (204 lb)   Height: 1.803 m (5' 11\")

## 2025-03-04 ENCOUNTER — ROUTINE PRENATAL (OUTPATIENT)
Dept: OBGYN CLINIC | Age: 35
End: 2025-03-04

## 2025-03-04 ENCOUNTER — PROCEDURE VISIT (OUTPATIENT)
Dept: OBGYN CLINIC | Age: 35
End: 2025-03-04
Payer: COMMERCIAL

## 2025-03-04 VITALS
DIASTOLIC BLOOD PRESSURE: 72 MMHG | BODY MASS INDEX: 28.7 KG/M2 | SYSTOLIC BLOOD PRESSURE: 110 MMHG | WEIGHT: 205 LBS | HEIGHT: 71 IN

## 2025-03-04 DIAGNOSIS — O99.820 GBS (GROUP B STREPTOCOCCUS CARRIER), +RV CULTURE, CURRENTLY PREGNANT: ICD-10-CM

## 2025-03-04 DIAGNOSIS — O36.63X0 EXCESSIVE FETAL GROWTH AFFECTING MANAGEMENT OF PREGNANCY IN THIRD TRIMESTER, SINGLE OR UNSPECIFIED FETUS: ICD-10-CM

## 2025-03-04 DIAGNOSIS — O43.193 MARGINAL INSERTION OF UMBILICAL CORD AFFECTING MANAGEMENT OF MOTHER IN THIRD TRIMESTER: ICD-10-CM

## 2025-03-04 DIAGNOSIS — O99.322 DRUG USE AFFECTING PREGNANCY IN SECOND TRIMESTER: ICD-10-CM

## 2025-03-04 DIAGNOSIS — O09.523 MULTIGRAVIDA OF ADVANCED MATERNAL AGE IN THIRD TRIMESTER: Primary | ICD-10-CM

## 2025-03-04 DIAGNOSIS — Z86.59 HISTORY OF ANXIETY: ICD-10-CM

## 2025-03-04 PROCEDURE — 0502F SUBSEQUENT PRENATAL CARE: CPT | Performed by: NURSE PRACTITIONER

## 2025-03-04 PROCEDURE — 76819 FETAL BIOPHYS PROFIL W/O NST: CPT | Performed by: OBSTETRICS & GYNECOLOGY

## 2025-03-04 NOTE — PROGRESS NOTES
Patient comes in today for routine prenatal visit. No complaints/concerns today.     Fetal Movement: Yes  Contractions: No  Vaginal Bleeding: No  Leaking Fluid: No  GI/: No    Vitals:    03/04/25 0925   BP: 110/72   Site: Left Upper Arm   Position: Sitting   Weight: 93 kg (205 lb)   Height: 1.803 m (5' 11\")     Chaperone for Intimate Exam     Chaperone was offer accepted as part of the rooming process    Chaperone: Luz Gilman     
Violence     Fear of Current or Ex-Partner: Not asked     Emotionally Abused: Not asked     Physically Abused: Not asked     Sexually Abused: Not asked   Housing Stability: Unknown (1/12/2025)    Housing Stability Vital Sign     Unable to Pay for Housing in the Last Year: Not on file     Number of Times Moved in the Last Year: 0     Homeless in the Last Year: No           Objective    Vitals:    03/04/25 0925   BP: 110/72   Site: Left Upper Arm   Position: Sitting   Weight: 93 kg (205 lb)   Height: 1.803 m (5' 11\")       General: well developed, well nourished, in no acute distress    Head: normocephalic and atraumatic    Resp: even and unlabored    Psych: Normal mood and affect        Assessment and Plan      Patient Active Problem List    Diagnosis Date Noted    GBS (group B Streptococcus carrier), +RV culture, currently pregnant 02/14/2025     Overview Note:     Treat in labor       Assessment & Plan Note:      noted      Excessive fetal growth affecting management of pregnancy in third trimester 02/11/2025     Overview Note:     2/4/2025: EFW 97%, AC 78%, LUANA nl, vertex, BPP 8/8. S/D ratio 1.3 -- carb limitation D/W pt  2/25/25:  EFW 81%, AC 65%, LUANA 15.9 cm, BPP 8/8, vtx        Assessment & Plan Note:      noted      Marginal insertion of umbilical cord affecting management of mother in third trimester 10/30/2024     Overview Note:     PLAN:  serial growth in 3rd trimester    1/3/2025: EFW 81%, AC 72%, LUANA nl, vertex. Tdap given   2/4/2025: EFW 97%, AC 78%, LUANA nl, vertex, BPP 8/8. S/D ratio 1.3  2/25/25:  EFW 81%, AC 65%, LUANA 15.9 cm, BPP 8/8, vtx       Assessment & Plan Note:      noted      History of anxiety and depression 09/04/2024     Overview Note:     09/09/24 [facility]: Reports depression 2 years ago.  Saw a therapist. Doing well.  10/24/24: denies any symptoms, report good moods  1/3/2025: denies any symptoms, report good moods       Assessment & Plan Note:      noted      Multigravida of advanced

## 2025-03-04 NOTE — ASSESSMENT & PLAN NOTE
PTL/labor precautions, FMC, and pregnancy warning signs reviewed. Pt advised to call the office at [phone] or go straight to Labor and Delivery at [facility] with any of the following concerns vaginal bleeding, leaking of fluid, yvonne regularly Q 5-7 minutes for over an hour or not feeling the baby move.   Rto 6 wk PPV  Strict FKCs/PTL precautions reviewed until IOL in am    BPP    LUANA WNL   POSTERIOR PLACENTA   FETUS IS VERTEX     ? Meconium noted today. D/w Dr Mosquera. Plan to proceed with IOL. Pt prefers in am, ok per M with strict precautions.     D/W pt at length induction vs spontaneous labor risks and benefits including but not limited to: favorable cervix, Brannon's score, elective vs medical induction, increased risk of longer latent stage, increased risk of FHT's abnormalities, tachysystole, increased risk of , placental abruption, uterine rupture, varying methods of cervical ripening and induction (cytotec, cervical balloon, cervidil and pitocin)  Pt understands, accepts all known and unknown risks and wishes to proceed.

## 2025-03-05 ENCOUNTER — HOSPITAL ENCOUNTER (INPATIENT)
Age: 35
LOS: 1 days | Discharge: HOME | End: 2025-03-06
Attending: OBSTETRICS & GYNECOLOGY | Admitting: OBSTETRICS & GYNECOLOGY
Payer: COMMERCIAL

## 2025-03-05 ENCOUNTER — ANESTHESIA EVENT (OUTPATIENT)
Dept: LABOR AND DELIVERY | Age: 35
End: 2025-03-05
Payer: COMMERCIAL

## 2025-03-05 ENCOUNTER — APPOINTMENT (OUTPATIENT)
Dept: LABOR AND DELIVERY | Age: 35
End: 2025-03-05
Payer: COMMERCIAL

## 2025-03-05 ENCOUNTER — ANESTHESIA (OUTPATIENT)
Dept: LABOR AND DELIVERY | Age: 35
End: 2025-03-05
Payer: COMMERCIAL

## 2025-03-05 PROBLEM — O09.523 AMA (ADVANCED MATERNAL AGE) MULTIGRAVIDA 35+, THIRD TRIMESTER: Status: ACTIVE | Noted: 2025-03-05

## 2025-03-05 PROBLEM — Z34.90 ENCOUNTER FOR ELECTIVE INDUCTION OF LABOR: Status: ACTIVE | Noted: 2025-03-05

## 2025-03-05 LAB
ABO + RH BLD: NORMAL
BASOPHILS # BLD: 0.03 K/UL (ref 0–0.2)
BASOPHILS NFR BLD: 0.2 % (ref 0–2)
BLOOD GROUP ANTIBODIES SERPL: NORMAL
DIFFERENTIAL METHOD BLD: ABNORMAL
EOSINOPHIL # BLD: 0.14 K/UL (ref 0–0.8)
EOSINOPHIL NFR BLD: 1.2 % (ref 0.5–7.8)
ERYTHROCYTE [DISTWIDTH] IN BLOOD BY AUTOMATED COUNT: 13.2 % (ref 11.9–14.6)
HCT VFR BLD AUTO: 32.8 % (ref 35.8–46.3)
HGB BLD-MCNC: 10.7 G/DL (ref 11.7–15.4)
IMM GRANULOCYTES # BLD AUTO: 0.06 K/UL (ref 0–0.5)
IMM GRANULOCYTES NFR BLD AUTO: 0.5 % (ref 0–5)
LYMPHOCYTES # BLD: 2.41 K/UL (ref 0.5–4.6)
LYMPHOCYTES NFR BLD: 20 % (ref 13–44)
MCH RBC QN AUTO: 28.1 PG (ref 26.1–32.9)
MCHC RBC AUTO-ENTMCNC: 32.6 G/DL (ref 31.4–35)
MCV RBC AUTO: 86.1 FL (ref 82–102)
MONOCYTES # BLD: 0.63 K/UL (ref 0.1–1.3)
MONOCYTES NFR BLD: 5.2 % (ref 4–12)
NEUTS SEG # BLD: 8.79 K/UL (ref 1.7–8.2)
NEUTS SEG NFR BLD: 72.9 % (ref 43–78)
NRBC # BLD: 0 K/UL (ref 0–0.2)
PLATELET # BLD AUTO: 244 K/UL (ref 150–450)
PMV BLD AUTO: 11.4 FL (ref 9.4–12.3)
RBC # BLD AUTO: 3.81 M/UL (ref 4.05–5.2)
SPECIMEN EXP DATE BLD: NORMAL
T PALLIDUM AB SER QL IA: NONREACTIVE
WBC # BLD AUTO: 12.1 K/UL (ref 4.3–11.1)

## 2025-03-05 PROCEDURE — 2580000003 HC RX 258: Performed by: OBSTETRICS & GYNECOLOGY

## 2025-03-05 PROCEDURE — 10907ZC DRAINAGE OF AMNIOTIC FLUID, THERAPEUTIC FROM PRODUCTS OF CONCEPTION, VIA NATURAL OR ARTIFICIAL OPENING: ICD-10-PCS | Performed by: OBSTETRICS & GYNECOLOGY

## 2025-03-05 PROCEDURE — 86780 TREPONEMA PALLIDUM: CPT

## 2025-03-05 PROCEDURE — 3700000025 EPIDURAL BLOCK: Performed by: ANESTHESIOLOGY

## 2025-03-05 PROCEDURE — 7220000101 HC DELIVERY VAGINAL/SINGLE

## 2025-03-05 PROCEDURE — 00HU33Z INSERTION OF INFUSION DEVICE INTO SPINAL CANAL, PERCUTANEOUS APPROACH: ICD-10-PCS | Performed by: ANESTHESIOLOGY

## 2025-03-05 PROCEDURE — 6360000002 HC RX W HCPCS: Performed by: OBSTETRICS & GYNECOLOGY

## 2025-03-05 PROCEDURE — 86850 RBC ANTIBODY SCREEN: CPT

## 2025-03-05 PROCEDURE — 7100000010 HC PHASE II RECOVERY - FIRST 15 MIN

## 2025-03-05 PROCEDURE — 51701 INSERT BLADDER CATHETER: CPT

## 2025-03-05 PROCEDURE — 59400 OBSTETRICAL CARE: CPT | Performed by: OBSTETRICS & GYNECOLOGY

## 2025-03-05 PROCEDURE — 86900 BLOOD TYPING SEROLOGIC ABO: CPT

## 2025-03-05 PROCEDURE — 7210000100 HC LABOR FEE PER 1 HR

## 2025-03-05 PROCEDURE — 6370000000 HC RX 637 (ALT 250 FOR IP): Performed by: OBSTETRICS & GYNECOLOGY

## 2025-03-05 PROCEDURE — 86901 BLOOD TYPING SEROLOGIC RH(D): CPT

## 2025-03-05 PROCEDURE — 85025 COMPLETE CBC W/AUTO DIFF WBC: CPT

## 2025-03-05 PROCEDURE — 10D17Z9 MANUAL EXTRACTION OF PRODUCTS OF CONCEPTION, RETAINED, VIA NATURAL OR ARTIFICIAL OPENING: ICD-10-PCS | Performed by: OBSTETRICS & GYNECOLOGY

## 2025-03-05 PROCEDURE — 1100000000 HC RM PRIVATE

## 2025-03-05 PROCEDURE — 0KQM0ZZ REPAIR PERINEUM MUSCLE, OPEN APPROACH: ICD-10-PCS | Performed by: OBSTETRICS & GYNECOLOGY

## 2025-03-05 PROCEDURE — 6360000002 HC RX W HCPCS: Performed by: NURSE ANESTHETIST, CERTIFIED REGISTERED

## 2025-03-05 PROCEDURE — 7100000011 HC PHASE II RECOVERY - ADDTL 15 MIN

## 2025-03-05 RX ORDER — POLYETHYLENE GLYCOL 3350 17 G/17G
17 POWDER, FOR SOLUTION ORAL DAILY
Status: DISCONTINUED | OUTPATIENT
Start: 2025-03-05 | End: 2025-03-06 | Stop reason: HOSPADM

## 2025-03-05 RX ORDER — SODIUM CHLORIDE, SODIUM LACTATE, POTASSIUM CHLORIDE, AND CALCIUM CHLORIDE .6; .31; .03; .02 G/100ML; G/100ML; G/100ML; G/100ML
500 INJECTION, SOLUTION INTRAVENOUS PRN
Status: DISCONTINUED | OUTPATIENT
Start: 2025-03-05 | End: 2025-03-05

## 2025-03-05 RX ORDER — ONDANSETRON 4 MG/1
4 TABLET, ORALLY DISINTEGRATING ORAL EVERY 6 HOURS PRN
Status: DISCONTINUED | OUTPATIENT
Start: 2025-03-05 | End: 2025-03-05

## 2025-03-05 RX ORDER — DEXTROSE, SODIUM CHLORIDE, SODIUM LACTATE, POTASSIUM CHLORIDE, AND CALCIUM CHLORIDE 5; .6; .31; .03; .02 G/100ML; G/100ML; G/100ML; G/100ML; G/100ML
INJECTION, SOLUTION INTRAVENOUS CONTINUOUS
Status: DISCONTINUED | OUTPATIENT
Start: 2025-03-05 | End: 2025-03-05

## 2025-03-05 RX ORDER — SODIUM CHLORIDE 0.9 % (FLUSH) 0.9 %
5-40 SYRINGE (ML) INJECTION EVERY 12 HOURS SCHEDULED
Status: DISCONTINUED | OUTPATIENT
Start: 2025-03-05 | End: 2025-03-05

## 2025-03-05 RX ORDER — METHYLERGONOVINE MALEATE 0.2 MG/ML
200 INJECTION INTRAVENOUS PRN
Status: DISCONTINUED | OUTPATIENT
Start: 2025-03-05 | End: 2025-03-05

## 2025-03-05 RX ORDER — ROPIVACAINE HYDROCHLORIDE 2 MG/ML
INJECTION, SOLUTION EPIDURAL; INFILTRATION; PERINEURAL
Status: DISCONTINUED | OUTPATIENT
Start: 2025-03-05 | End: 2025-03-05 | Stop reason: SDUPTHER

## 2025-03-05 RX ORDER — ACETAMINOPHEN 500 MG
1000 TABLET ORAL EVERY 8 HOURS
Status: DISCONTINUED | OUTPATIENT
Start: 2025-03-05 | End: 2025-03-06 | Stop reason: HOSPADM

## 2025-03-05 RX ORDER — METHYLERGONOVINE MALEATE 0.2 MG/ML
200 INJECTION INTRAVENOUS PRN
Status: DISCONTINUED | OUTPATIENT
Start: 2025-03-05 | End: 2025-03-06 | Stop reason: HOSPADM

## 2025-03-05 RX ORDER — OXYCODONE HYDROCHLORIDE 5 MG/1
5 TABLET ORAL EVERY 4 HOURS PRN
Status: DISCONTINUED | OUTPATIENT
Start: 2025-03-05 | End: 2025-03-06 | Stop reason: HOSPADM

## 2025-03-05 RX ORDER — TRANEXAMIC ACID 10 MG/ML
1000 INJECTION, SOLUTION INTRAVENOUS
Status: DISCONTINUED | OUTPATIENT
Start: 2025-03-05 | End: 2025-03-05

## 2025-03-05 RX ORDER — SODIUM CHLORIDE 0.9 % (FLUSH) 0.9 %
5-40 SYRINGE (ML) INJECTION PRN
Status: DISCONTINUED | OUTPATIENT
Start: 2025-03-05 | End: 2025-03-06 | Stop reason: HOSPADM

## 2025-03-05 RX ORDER — ONDANSETRON 2 MG/ML
4 INJECTION INTRAMUSCULAR; INTRAVENOUS EVERY 6 HOURS PRN
Status: DISCONTINUED | OUTPATIENT
Start: 2025-03-05 | End: 2025-03-05

## 2025-03-05 RX ORDER — LIDOCAINE HYDROCHLORIDE AND EPINEPHRINE 15; 5 MG/ML; UG/ML
INJECTION, SOLUTION EPIDURAL
Status: DISCONTINUED | OUTPATIENT
Start: 2025-03-05 | End: 2025-03-05 | Stop reason: SDUPTHER

## 2025-03-05 RX ORDER — MISOPROSTOL 200 UG/1
400 TABLET ORAL PRN
Status: DISCONTINUED | OUTPATIENT
Start: 2025-03-05 | End: 2025-03-05

## 2025-03-05 RX ORDER — SODIUM CHLORIDE, SODIUM LACTATE, POTASSIUM CHLORIDE, AND CALCIUM CHLORIDE .6; .31; .03; .02 G/100ML; G/100ML; G/100ML; G/100ML
1000 INJECTION, SOLUTION INTRAVENOUS PRN
Status: DISCONTINUED | OUTPATIENT
Start: 2025-03-05 | End: 2025-03-05

## 2025-03-05 RX ORDER — FAMOTIDINE 20 MG/1
20 TABLET, FILM COATED ORAL 2 TIMES DAILY PRN
Status: DISCONTINUED | OUTPATIENT
Start: 2025-03-05 | End: 2025-03-06 | Stop reason: HOSPADM

## 2025-03-05 RX ORDER — IBUPROFEN 800 MG/1
800 TABLET, FILM COATED ORAL EVERY 8 HOURS
Status: DISCONTINUED | OUTPATIENT
Start: 2025-03-05 | End: 2025-03-06 | Stop reason: HOSPADM

## 2025-03-05 RX ORDER — TERBUTALINE SULFATE 1 MG/ML
0.25 INJECTION SUBCUTANEOUS ONCE
Status: DISCONTINUED | OUTPATIENT
Start: 2025-03-05 | End: 2025-03-05

## 2025-03-05 RX ORDER — LANOLIN
CREAM (ML) TOPICAL PRN
Status: DISCONTINUED | OUTPATIENT
Start: 2025-03-05 | End: 2025-03-06 | Stop reason: HOSPADM

## 2025-03-05 RX ORDER — SODIUM CHLORIDE 0.9 % (FLUSH) 0.9 %
5-40 SYRINGE (ML) INJECTION EVERY 12 HOURS SCHEDULED
Status: DISCONTINUED | OUTPATIENT
Start: 2025-03-05 | End: 2025-03-06 | Stop reason: HOSPADM

## 2025-03-05 RX ORDER — ONDANSETRON 4 MG/1
4 TABLET, ORALLY DISINTEGRATING ORAL EVERY 6 HOURS PRN
Status: DISCONTINUED | OUTPATIENT
Start: 2025-03-05 | End: 2025-03-06 | Stop reason: HOSPADM

## 2025-03-05 RX ORDER — MISOPROSTOL 200 UG/1
200 TABLET ORAL PRN
Status: DISCONTINUED | OUTPATIENT
Start: 2025-03-05 | End: 2025-03-06 | Stop reason: HOSPADM

## 2025-03-05 RX ORDER — SIMETHICONE 80 MG
80 TABLET,CHEWABLE ORAL EVERY 6 HOURS PRN
Status: DISCONTINUED | OUTPATIENT
Start: 2025-03-05 | End: 2025-03-06 | Stop reason: HOSPADM

## 2025-03-05 RX ORDER — SODIUM CHLORIDE 9 MG/ML
INJECTION, SOLUTION INTRAVENOUS PRN
Status: DISCONTINUED | OUTPATIENT
Start: 2025-03-05 | End: 2025-03-06 | Stop reason: HOSPADM

## 2025-03-05 RX ORDER — SODIUM CHLORIDE 9 MG/ML
INJECTION, SOLUTION INTRAVENOUS PRN
Status: DISCONTINUED | OUTPATIENT
Start: 2025-03-05 | End: 2025-03-05

## 2025-03-05 RX ORDER — SODIUM CHLORIDE 0.9 % (FLUSH) 0.9 %
5-40 SYRINGE (ML) INJECTION PRN
Status: DISCONTINUED | OUTPATIENT
Start: 2025-03-05 | End: 2025-03-05

## 2025-03-05 RX ORDER — ONDANSETRON 2 MG/ML
4 INJECTION INTRAMUSCULAR; INTRAVENOUS EVERY 6 HOURS PRN
Status: DISCONTINUED | OUTPATIENT
Start: 2025-03-05 | End: 2025-03-06 | Stop reason: HOSPADM

## 2025-03-05 RX ORDER — SODIUM CHLORIDE, SODIUM LACTATE, POTASSIUM CHLORIDE, CALCIUM CHLORIDE 600; 310; 30; 20 MG/100ML; MG/100ML; MG/100ML; MG/100ML
INJECTION, SOLUTION INTRAVENOUS CONTINUOUS
Status: DISCONTINUED | OUTPATIENT
Start: 2025-03-05 | End: 2025-03-06 | Stop reason: HOSPADM

## 2025-03-05 RX ADMIN — PENICILLIN G POTASSIUM 5 MILLION UNITS: 5000000 INJECTION, POWDER, FOR SOLUTION INTRAMUSCULAR; INTRAVENOUS at 09:18

## 2025-03-05 RX ADMIN — WITCH HAZEL: 500 SOLUTION RECTAL; TOPICAL at 22:16

## 2025-03-05 RX ADMIN — ROPIVACAINE HYDROCHLORIDE 10 ML/HR: 2 INJECTION, SOLUTION EPIDURAL; INFILTRATION; PERINEURAL at 12:51

## 2025-03-05 RX ADMIN — ROPIVACAINE HYDROCHLORIDE 3 ML: 2 INJECTION, SOLUTION EPIDURAL; INFILTRATION; PERINEURAL at 12:50

## 2025-03-05 RX ADMIN — OXYTOCIN 2 MILLI-UNITS/MIN: 10 INJECTION, SOLUTION INTRAMUSCULAR; INTRAVENOUS at 09:22

## 2025-03-05 RX ADMIN — SODIUM CHLORIDE 2.5 MILLION UNITS: 9 INJECTION, SOLUTION INTRAVENOUS at 13:02

## 2025-03-05 RX ADMIN — ROPIVACAINE HYDROCHLORIDE 3 ML: 2 INJECTION, SOLUTION EPIDURAL; INFILTRATION; PERINEURAL at 12:46

## 2025-03-05 RX ADMIN — OXYTOCIN 999 MILLI-UNITS/MIN: 10 INJECTION, SOLUTION INTRAMUSCULAR; INTRAVENOUS at 18:02

## 2025-03-05 RX ADMIN — ROPIVACAINE HYDROCHLORIDE 3 ML: 2 INJECTION, SOLUTION EPIDURAL; INFILTRATION; PERINEURAL at 12:48

## 2025-03-05 RX ADMIN — ROPIVACAINE HYDROCHLORIDE 1 ML: 2 INJECTION, SOLUTION EPIDURAL; INFILTRATION; PERINEURAL at 12:49

## 2025-03-05 RX ADMIN — ACETAMINOPHEN 1000 MG: 500 TABLET, FILM COATED ORAL at 22:17

## 2025-03-05 RX ADMIN — SODIUM CHLORIDE, SODIUM LACTATE, POTASSIUM CHLORIDE, CALCIUM CHLORIDE AND DEXTROSE MONOHYDRATE: 5; 600; 310; 30; 20 INJECTION, SOLUTION INTRAVENOUS at 08:47

## 2025-03-05 RX ADMIN — IBUPROFEN 800 MG: 800 TABLET, FILM COATED ORAL at 19:23

## 2025-03-05 RX ADMIN — LIDOCAINE HYDROCHLORIDE,EPINEPHRINE BITARTRATE 3 ML: 15; .005 INJECTION, SOLUTION EPIDURAL; INFILTRATION; INTRACAUDAL; PERINEURAL at 12:43

## 2025-03-05 RX ADMIN — SODIUM CHLORIDE 2.5 MILLION UNITS: 9 INJECTION, SOLUTION INTRAVENOUS at 17:23

## 2025-03-05 RX ADMIN — POLYETHYLENE GLYCOL 3350 17 G: 17 POWDER, FOR SOLUTION ORAL at 22:17

## 2025-03-05 RX ADMIN — Medication: at 22:17

## 2025-03-05 ASSESSMENT — PAIN SCALES - GENERAL: PAINLEVEL_OUTOF10: 0

## 2025-03-05 NOTE — ANESTHESIA PRE PROCEDURE
GI/Hepatic/Renal: Neg GI/Hepatic/Renal ROS            Endo/Other: Negative Endo/Other ROS                    Abdominal:             Vascular: negative vascular ROS.         Other Findings:       Anesthesia Plan      epidural     ASA 2       Induction: intravenous.      Anesthetic plan and risks discussed with patient and spouse.                    Neil Heiwtt MD   3/5/2025

## 2025-03-05 NOTE — L&D DELIVERY NOTE
1823    Delivery Admission: 25 0801 - 25 1823         Intrapartum & Postpartum Delivery Admission    None                  End of Mother's Information  Mother: Sol Mccoy #[id number]                Delivery Providers    Delivering clinician: Celeste Scruggs DO     Provider Role    Celeste Scruggs DO Obstetrician    Yamile Blakely RN Primary Nurse    Remington Olivarez RN Primary [place] Nurse    Velvet Stewart [facility]              [place] Assessment    Living Status: Living        Skin Color:   Heart Rate:   Reflex Irritability:   Muscle Tone:   Respiratory Effort:   Total:            1 Minute:    0    2    2    2    2    8         5 Minute:    1    2    2    2    2    9                                        Apgars Assigned By: REMINGTON OLIVAREZ RN              Resuscitation    Method: Bulb Suction, Stimulation              Measurements      Birth Weight: 3630 g   Birth Length: 50 cm     Head Circumference: 35.5 cm     Chest Circumference: 33.5 cm                     of a Viable I on 25  Apgars 8, 9  C/C/+2-->pushed to deliver head onto intact perineum.  Body followed easily thereafter.   Delayed cord clamping, baby to mom.  Cord clamped/cut.  Cord gases were drawn.      Placenta delivered but tore with delivery. On bimanual exam retained placenta mostly in the right fundal area. I was able to express this and it appeared to be an accessory lobe. This took several sweeps to get out and came out in small pieces. Bleeding was scant during this. On final bimanual exam no retained placenta was noted.  Dr. Garcia was asked to come run US to confirm all placenta was removed. Thin EMS noted.      A second degree lac noted which was repaired with 2.0 and 3.0 vicryl in the usual fashion.  FF w/ pit and massage. Bladder emptied. QBL per RN.  Mom/baby stable.       Celeste Scruggs DO

## 2025-03-05 NOTE — H&P
Labor Induction Admission Note    Sol Mccoy  [id number]  Estimated Date of Delivery: 3/11/25     OB History          2    Para   1    Term   1            AB        Living   1         SAB        IAB        Ectopic        Molar        Multiple        Live Births   1                Patient admitted with pregnancy at 39w1d for induction of labor due to EIOL/LGA, ? [facility] on US yesterday. Prenatal course was complicated by LGA. 25:  EFW 81%, AC 65%, LUANA 15.9 cm, BPP 8/8, vtx  Please see prenatal records for details.      No current facility-administered medications for this encounter.        EXAM: Cervical Exam:  3 cm in office yesterday                 Fetal Heart Rate: Pending.     Labs:   Lab Results   Component Value Date/Time    ABORH A POSITIVE 2024 03:28 PM    HBSAGEXT negative 2021 12:00 AM    RUBELLAEXT Immune 2021 12:00 AM    GRBSEXT Negative 2021 12:00 AM          Plan: Admit for induction of labor. Group B Strep positive, will treat prophylactically with penicillin. Can have epidural when she desires.   - Watch labor curve closely          Celeste Scruggs DO  2025   8:37 AM

## 2025-03-05 NOTE — PROGRESS NOTES
03/05/25 1309   Cervical Exam   Dilation (cm) 5   Effacement 70   Station -1   Station (Labor Curve Graph) 6   OB Examiner Dr Scruggs   Membrane/Amniotic Fluid   Membrane Status Artificial   Sac Identifier Sac 1   Rupture Date 02/05/25   Rupture Time 1309   Amniotic Fluid Color Clear;Pink   Amniotic Fluid Odor None   Amniotic Fluid Amount Moderate

## 2025-03-05 NOTE — PROGRESS NOTES
EPIDURAL PLACEMENT      Dr Hewitt at bedside at 1230.  DERRICK Gruber at bedside at 1230    Assisted pt to sitting up on bedside at 1229.    Timeout completed at 1234 with MD, DERRICK and myself at bedside.    Test dose given at 1243.  Negative reaction.    Dose given at 1248.    Pt assisted to lying back in left tilt position.    See anesthesia record for details.  See vital sign flow sheet for BP.    Tolerated procedure well.

## 2025-03-05 NOTE — ANESTHESIA PROCEDURE NOTES
Epidural Block    Patient location during procedure: OB  Start time: 3/5/2025 12:34 PM  End time: 3/5/2025 12:42 PM  Reason for block: labor epidural  Staffing  Performed: anesthesiologist   Anesthesiologist: Neil Hewitt MD  Performed by: Neil Hewitt MD  Authorized by: Neil Hewitt MD    Epidural  Patient position: sitting  Prep: ChloraPrep  Patient monitoring: continuous pulse ox and frequent blood pressure checks  Approach: midline  Location: L3-4  Injection technique: PHONG saline  Provider prep: mask and sterile gloves  Needle  Needle type: Tuohy   Needle gauge: 17 G  Needle length: 3.5 in (10 cm)  Needle insertion depth: 5 cm  Catheter type: end hole  Catheter size: 19 G  Catheter at skin depth: 10 cm  Test dose: negativeCatheter Secured: tegaderm and tape (liquid adhesive)  Assessment  Hemodynamics: stable  Attempts: 1  Outcomes: patient tolerated procedure well and uncomplicated  Additional Notes  3 cc 1% lidocaine local at needle insertion site.      Discussed the risks and benefits of epidural placement and use with patient including (but not limited to) the risk of wet tap and spinal headache, inadequate analgesia, need for removal/replacement of epidural, and hypotension. I discussed the remote risk of uncontrolled bleeding or infection requiring an operation to remedy.  After the patient agreed to proceed, I ensured the patient had no contraindications to labor epidural placement including prohibitive heart defects, hypocoagulation, family or personal history of a bleeding disorder.  Epidural placement is described in the block note.  Frequent hemodynamic monitoring in the first 30 minutes following initial placement was performed.  The patient and OB RN were instructed to call anytime with any questions or concerns at anytime.     Preanesthetic Checklist  Completed: patient identified, IV checked, risks and benefits discussed, equipment checked, pre-op evaluation, timeout performed,

## 2025-03-06 VITALS
RESPIRATION RATE: 18 BRPM | TEMPERATURE: 97.7 F | BODY MASS INDEX: 28.7 KG/M2 | SYSTOLIC BLOOD PRESSURE: 116 MMHG | HEART RATE: 86 BPM | WEIGHT: 205 LBS | HEIGHT: 71 IN | DIASTOLIC BLOOD PRESSURE: 78 MMHG | OXYGEN SATURATION: 96 %

## 2025-03-06 PROCEDURE — 6370000000 HC RX 637 (ALT 250 FOR IP): Performed by: OBSTETRICS & GYNECOLOGY

## 2025-03-06 RX ORDER — IBUPROFEN 600 MG/1
600 TABLET, FILM COATED ORAL 3 TIMES DAILY PRN
Qty: 30 TABLET | Refills: 0 | Status: SHIPPED | OUTPATIENT
Start: 2025-03-06

## 2025-03-06 RX ADMIN — ACETAMINOPHEN 1000 MG: 500 TABLET, FILM COATED ORAL at 06:01

## 2025-03-06 RX ADMIN — ACETAMINOPHEN 1000 MG: 500 TABLET, FILM COATED ORAL at 14:47

## 2025-03-06 RX ADMIN — IBUPROFEN 800 MG: 800 TABLET, FILM COATED ORAL at 10:51

## 2025-03-06 RX ADMIN — IBUPROFEN 800 MG: 800 TABLET, FILM COATED ORAL at 03:11

## 2025-03-06 ASSESSMENT — PAIN - FUNCTIONAL ASSESSMENT
PAIN_FUNCTIONAL_ASSESSMENT: ACTIVITIES ARE NOT PREVENTED
PAIN_FUNCTIONAL_ASSESSMENT: ACTIVITIES ARE NOT PREVENTED

## 2025-03-06 NOTE — LACTATION NOTE
This note was copied from a baby's chart.  Individualized Feeding Plan for Breastfeeding   Lactation Services [phone]    As much as possible, hold your baby on your chest so baby’s bare skin is against your bare skin with a blanket covering baby’s back, especially 30 minutes before it is time for baby to eat.    Watch for early feeding cues such as, licking lips, sucking motions, rooting, hands to mouth. Crying is a late feeding cue.      Feed your baby at least 8 times in 24 hours, or more if your baby is showing feeding cues.  If baby is sleepy put baby skin to skin and watch for hunger cues.  To rouse baby: unwrap, undress, massage hands, feet, & back, change diaper, gently change baby’s position from lying to sitting.   15-20 minutes on the first breast of active breastfeeding is considered a good feeding. Good, active breastfeeding is when baby is alert, tugging the nipple, their ear may move, and you can hear swallows.  Allow baby to finish the first side before changing sides.     Sleeping at the breast or only brief, light sucks should not be considered a good, full breastfeed.  At each feeding:  __x__1.  Do “Suck Practice” on finger before each feeding until sucking pattern is smooth.  Try using index finger.  Nail down towards tongue.       __x__2.  Hand Express for a few minutes prior to latching to help start milk flow.     __x__3.  Baby needs to NURSE WELL x 15-20 minutes on at least first breast, burp and offer 2nd breast at every feeding.  If no sustained latch only attempt at breast for 5-10 minutes.  Based on latch ability, may only want to attempt at breast every other feeding.    Given history of low milk supply, can supplement formula post nursing if desired or baby seems hungry or low output.     Start with 15ml after each breastfeeding and increase as needed     Insurance pumping can be helpful too, pump x 15 minutes after a few daytime feeds     If baby does NOT latch on and feed well

## 2025-03-06 NOTE — PLAN OF CARE
Implement non-pharmacological measures as appropriate and evaluate response   Consider cultural and social influences on pain and pain management   Notify Licensed Independent Practitioner if interventions unsuccessful or patient reports new pain  3/5/2025 1919 by Bobbi Santos RN  Outcome: Progressing     Problem: Infection - Adult  Goal: Absence of infection at discharge  3/5/2025 2202 by Roro Souza RN  Outcome: Progressing  3/5/2025 1919 by Bobbi Santos RN  Outcome: Progressing  Goal: Absence of infection during hospitalization  3/5/2025 2202 by Roro Souza RN  Outcome: Progressing  3/5/2025 1919 by Bobbi Santos RN  Outcome: Progressing  Goal: Absence of fever/infection during anticipated neutropenic period  3/5/2025 2202 by Roro Souza RN  Outcome: Progressing  3/5/2025 1919 by Bobbi Santos RN  Outcome: Progressing     Problem: Safety - Adult  Goal: Free from fall injury  3/5/2025 2202 by Roro Souza RN  Outcome: Progressing  3/5/2025 1919 by Bobbi Santos RN  Outcome: Progressing     Problem: Discharge Planning  Goal: Discharge to home or other facility with appropriate resources  3/5/2025 2202 by Roro Souza RN  Outcome: Progressing  3/5/2025 1919 by Bobbi Santos RN  Outcome: Progressing     Problem: Chronic Conditions and Co-morbidities  Goal: Patient's chronic conditions and co-morbidity symptoms are monitored and maintained or improved  3/5/2025 2202 by Roro Souza RN  Outcome: Progressing  3/5/2025 1919 by Bobbi Santos RN  Outcome: Progressing

## 2025-03-06 NOTE — PROGRESS NOTES
Admission assessment complete as noted. Patient oriented to room and unit. Plan of care reviewed and patient verbalizes understanding. Questions encouraged and answered. Patent encouraged to call for needs or concerns.   Safety Teaching reviewed:   Hand hygiene prior to handling the infant.  Use of bulb syringe.  Bracelets with matching numbers are placed on mother and infant  An infant security tag  (Hugs) is placed on the infant's ankle and monitored  All OB nurses wear pink Employee badges - do not give your baby to anyone without proper identification.   Never leave the baby alone in the room.  The infant should be placed on their back to sleep.on a firm mattress. No toys should be placed in the crib. (safe sleep video offered to view)  Never shake the baby (video offered to view)  Infant fall prevention - do not sleep with the baby, and place the baby in the crib while ambulating.   Mother and Baby Care booklet given to Mother.

## 2025-03-06 NOTE — DISCHARGE INSTRUCTIONS
and be sure to contact your doctor or midwife if:    Your vaginal bleeding isn't decreasing.     You feel sad, anxious, or hopeless for more than a few days.     You are having problems with your breasts or breastfeeding.   Where can you learn more?  Go to https://www.[employer].net/patientEd and enter Q237 to learn more about \"Vaginal Childbirth: Care Instructions.\"  Current as of: April 30, 2024  Content Version: 14.3  © 2024 [employer].   Care instructions adapted under license by [employer]. If you have questions about a medical condition or this instruction, always ask your healthcare professional. [employer] disclaims any warranty or liability for your use of this information.

## 2025-03-06 NOTE — DISCHARGE SUMMARY
Obstetric Discharge Summary    Admitting Diagnosis  Patient admitted with pregnancy at 39w1d for induction of labor due to EIOL/LGA, ? [facility] on US yesterday. She had an uncomplicated labor and delivery course. She is meeting all goals postpartum.   On day of discharge, she was ambulating well, afebrile, with lochia < menses.  She was discharged home with medications as below.  Routine PP instructions given to patient.  She is to follow up with [facility] in 6 weeks for PP exam.    OB History          2    Para   2    Term   2            AB        Living   2         SAB        IAB        Ectopic        Molar        Multiple   0    Live Births   2                Reasons for Admission on 3/5/2025  8:01 AM  AMA (advanced maternal age) multigravida 35+, third trimester [O09.523]  No comment available  Induction of Labor    Prenatal Procedures  None    Intrapartum Procedures        Multiple birth?: No        Spontaneous Vaginal Delivery: See Labor and Delivery Summary       Postpartum Procedures  None    Postpartum/Operative Complications       [address] Data  Information for the patient's :  Juan Mccoy [[id number]]   male   Birth Weight: 3.63 kg (8 lb)  Discharge With Mother  Complications: No    Discharge Diagnosis       Discharge Information  Current Discharge Medication List        START taking these medications    Details   ibuprofen (ADVIL;MOTRIN) 600 MG tablet Take 1 tablet by mouth 3 times daily as needed for Pain  Qty: 30 tablet, Refills: 0           CONTINUE these medications which have NOT CHANGED    Details   Calcium Carbonate Antacid (TUMS E-X PO) Take by mouth      Prenatal w/o A Vit-Fe Fum-FA (PRENATA PO) Take by mouth Gummies      Doxylamine Succinate, Sleep, (UNISOM PO) Take by mouth      docusate sodium (COLACE) 100 MG capsule Take 1 capsule by mouth 2 times daily as needed             No discharge procedures on file.    Discharge to: Home  Follow up in 6

## 2025-03-06 NOTE — PROGRESS NOTES
PPD 1 , at 39w1d,  IOL Elective/ LGA    S:  Pt doing well this AM.  Pain controlled w/po meds.  Lochia scant.  + Ambulation.  Tolerating regular diet.        /78   Pulse 86   Temp 97.7 °F (36.5 °C) (Oral)   Resp 18   Ht 1.803 m (5' 11\")   Wt 93 kg (205 lb)   LMP 2024   SpO2 96%   Breastfeeding Unknown   BMI 28.59 kg/m²      Physical exam:  HEENT: NCAT   Cardiovascular: RRR  Respiratory: Normal effort  Abdomen: fundus firm, non-distended   Ext: trace edema, no calf tenderness    A/P:   Delivery complicated by accessory lobe that was removed bimanual, afebrile, minimal bleeding    Continue routine pp care     Desires PM discharge

## 2025-03-06 NOTE — LACTATION NOTE
This note was copied from a baby's chart.  Lactation visit. 2nd time mom. Early discharge at 24 hours requested. Baby has been latching well per mom and RN report. Mom had significant milk supply struggles with first child, did a lot of extra work to help build milk supply to no avail. See added note below. Discussed this and feeding expectations. Keep latching at all feeds if baby willing. Watch output closely. Low threshold for supplementation given history. Insurance pumping some if able given history recommended. Will work  hard x 2 weeks postpartum and then reassess milk volume at that time. Triple feeding plan that mom did for months last time is not recommended or sustainable. Emotionally very taxing on mom. Questions answered. RN updated.      Of note, mom reports extensive work to build milk supply with first baby and she tried numerous things such as extra pumping, herbs and worked with lactation. At her \"peak\" volume she was producing ~1oz total between both breasts every 3 hours, very low volume considering. Breasts appear normal in size and shape, reports NO breast change with pregnancy x 2 now. No medical history with concerns for risk factors for low milk supply. However, Mom does report heavy, persistent red bleeding  x 10 weeks following first delivery. Symptoms and low milk supply with very adequate and abundant work to increase supply make retained placenta a possibility. Mom also noted that following this current delivery that the MD had to manually remove placenta and US performed post delivery to check for any retained placenta. Needs close monitoring of bleeding and milk volume.

## 2025-03-06 NOTE — CARE COORDINATION
Chart reviewed - depression/anxiety.  SW met with patient to complete initial assessment.    Patient confirms a history of generalized depression and anxiety.  She has not required medication for approximately 5 years and denies any depression/anxiety since that time or during this pregnancy.    Patient given informational packet on  mood & anxiety disorders (resources/education).    Family denies any additional needs from  at this time.  Family has 's contact information should any needs/questions arise.    Kesha Cespedes, ACE-MARIA GUADALUPE, [facility]  [facility]   [phone]

## 2025-03-06 NOTE — ANESTHESIA POSTPROCEDURE EVALUATION
Department of Anesthesiology  Postprocedure Note    Patient: Sol Mccoy  MRN: [id number]  YOB: 1990  Date of evaluation: 3/6/2025    Procedure Summary       Date: 03/05/25 Room / Location:     Anesthesia Start: 1230 Anesthesia Stop: 1758    Procedure: Labor Analgesia Diagnosis:     Scheduled Providers:  Responsible Provider: Neil Hewitt MD    Anesthesia Type: epidural ASA Status: 2            Anesthesia Type: No value filed.    Gurwinder Phase I: Gurwinder Score: 10    Gurwinder Phase II:      Anesthesia Post Evaluation    Patient location during evaluation: PACU  Patient participation: complete - patient participated  Level of consciousness: awake and alert  Airway patency: patent  Nausea & Vomiting: no nausea and no vomiting  Cardiovascular status: hemodynamically stable  Respiratory status: acceptable, nonlabored ventilation and spontaneous ventilation  Hydration status: euvolemic  Comments: /73   Pulse 76   Temp 97.7 °F (36.5 °C) (Oral)   Resp 18   Ht 1.803 m (5' 11\")   Wt 93 kg (205 lb)   LMP 06/09/2024   SpO2 97%   Breastfeeding Unknown   BMI 28.59 kg/m²     Multimodal analgesia pain management approach  Pain management: adequate and satisfactory to patient    No notable events documented.

## 2025-03-14 ENCOUNTER — TELEPHONE (OUTPATIENT)
Dept: OBGYN CLINIC | Age: 35
End: 2025-03-14

## 2025-03-14 RX ORDER — VALACYCLOVIR HYDROCHLORIDE 1 G/1
2000 TABLET, FILM COATED ORAL 2 TIMES DAILY
Qty: 4 TABLET | Refills: 0 | Status: SHIPPED | OUTPATIENT
Start: 2025-03-14

## 2025-03-14 NOTE — TELEPHONE ENCOUNTER
Patient sent the message below:    Hi, I’m just wondering if it’s possible to request the cold sore medication I was prescribed during my last pregnancy (2021). I feel one coming on and I have the new baby, I was hoping to not have to make a separate doctors appointment with my gp. Thank you!

## 2025-04-14 PROBLEM — Z34.90 ENCOUNTER FOR ELECTIVE INDUCTION OF LABOR: Status: RESOLVED | Noted: 2025-03-05 | Resolved: 2025-04-14

## 2025-04-14 PROBLEM — O99.322 DRUG USE AFFECTING PREGNANCY IN SECOND TRIMESTER: Status: RESOLVED | Noted: 2024-07-25 | Resolved: 2025-04-14

## 2025-04-14 PROBLEM — O09.523 AMA (ADVANCED MATERNAL AGE) MULTIGRAVIDA 35+, THIRD TRIMESTER: Status: RESOLVED | Noted: 2025-03-05 | Resolved: 2025-04-14

## 2025-04-14 PROBLEM — O36.63X0 EXCESSIVE FETAL GROWTH AFFECTING MANAGEMENT OF PREGNANCY IN THIRD TRIMESTER: Status: RESOLVED | Noted: 2025-02-11 | Resolved: 2025-04-14

## 2025-04-14 PROBLEM — O09.523 MULTIGRAVIDA OF ADVANCED MATERNAL AGE IN THIRD TRIMESTER: Status: RESOLVED | Noted: 2024-07-25 | Resolved: 2025-04-14

## 2025-04-14 PROBLEM — O43.193 MARGINAL INSERTION OF UMBILICAL CORD AFFECTING MANAGEMENT OF MOTHER IN THIRD TRIMESTER: Status: RESOLVED | Noted: 2024-10-30 | Resolved: 2025-04-14

## 2025-04-14 PROBLEM — O99.820 GBS (GROUP B STREPTOCOCCUS CARRIER), +RV CULTURE, CURRENTLY PREGNANT: Status: RESOLVED | Noted: 2025-02-14 | Resolved: 2025-04-14

## 2025-04-14 NOTE — PROGRESS NOTES
Patient comes in today for 6 week post partum visit.     Delivery Method: Vaginal     Delivery Date: 03/05/2025    Birth Control: {Contraception Methods:5051}    Breast/Bottle: ***    Bleeding: ***    Baby: Doing well    Bowel/Bladder: No issues    Blues: None    Last pap smear:  01/20/2021 negative, HPV negative     There were no vitals filed for this visit.    Emily Fang MA  3:55 PM  04/14/25

## 2025-04-15 ENCOUNTER — POSTPARTUM VISIT (OUTPATIENT)
Dept: OBGYN CLINIC | Age: 35
End: 2025-04-15
Payer: COMMERCIAL

## 2025-04-15 VITALS
BODY MASS INDEX: 27.3 KG/M2 | DIASTOLIC BLOOD PRESSURE: 66 MMHG | HEIGHT: 71 IN | WEIGHT: 195 LBS | SYSTOLIC BLOOD PRESSURE: 118 MMHG

## 2025-04-15 DIAGNOSIS — Z86.59 HISTORY OF ANXIETY: ICD-10-CM

## 2025-04-15 PROCEDURE — 0503F POSTPARTUM CARE VISIT: CPT | Performed by: OBSTETRICS & GYNECOLOGY

## 2025-04-15 PROCEDURE — 99213 OFFICE O/P EST LOW 20 MIN: CPT | Performed by: OBSTETRICS & GYNECOLOGY

## 2025-04-15 ASSESSMENT — PATIENT HEALTH QUESTIONNAIRE - PHQ9
2. FEELING DOWN, DEPRESSED OR HOPELESS: NOT AT ALL
SUM OF ALL RESPONSES TO PHQ QUESTIONS 1-9: 0
1. LITTLE INTEREST OR PLEASURE IN DOING THINGS: NOT AT ALL
SUM OF ALL RESPONSES TO PHQ QUESTIONS 1-9: 0

## 2025-04-15 ASSESSMENT — ANXIETY QUESTIONNAIRES
7. FEELING AFRAID AS IF SOMETHING AWFUL MIGHT HAPPEN: NEARLY EVERY DAY
IF YOU CHECKED OFF ANY PROBLEMS ON THIS QUESTIONNAIRE, HOW DIFFICULT HAVE THESE PROBLEMS MADE IT FOR YOU TO DO YOUR WORK, TAKE CARE OF THINGS AT HOME, OR GET ALONG WITH OTHER PEOPLE: SOMEWHAT DIFFICULT
3. WORRYING TOO MUCH ABOUT DIFFERENT THINGS: NEARLY EVERY DAY
GAD7 TOTAL SCORE: 16
6. BECOMING EASILY ANNOYED OR IRRITABLE: NEARLY EVERY DAY
2. NOT BEING ABLE TO STOP OR CONTROL WORRYING: SEVERAL DAYS
4. TROUBLE RELAXING: NEARLY EVERY DAY
1. FEELING NERVOUS, ANXIOUS, OR ON EDGE: NEARLY EVERY DAY
5. BEING SO RESTLESS THAT IT IS HARD TO SIT STILL: NOT AT ALL

## 2025-04-15 NOTE — PROGRESS NOTES
[facility]  [address] [facility], [place] [address]  [phone]    Tristen Schaffer MD, FACOG  [facility]    6 week Postpartum Office Visit    Sol Mccoy is a 34 y.o.  that presents for PP visit today    Delivery Method: Vaginal      Delivery Date: 2025     Birth Control: none     Breast/Bottle: both     Bleeding: none     Baby: Doing well     Bowel/Bladder: No issues     Blues: None     Last pap smear:  2021 negative, HPV negative     OB History    Para Term  AB Living   2 2 2   2   SAB IAB Ectopic Molar Multiple Live Births       0 2      # Outcome Date GA Lbr Angus/2nd Weight Sex Type Anes PTL Lv   2 Term 25 39w1d 08:00 / 00:28 3.63 kg (8 lb) M Vag-Spont EPI N JL   1 Term 19 40w0d 10:10 / 00:16 2.835 kg (6 lb 4 oz) M Vag-Spont EPI N JL        Past Medical History:   Diagnosis Date    ADD (attention deficit disorder)     Anxiety and depression 2019    Medication use agreement signed 19  Xanax only prn. Mainly for flying.   Doesn't want to see therapist currently bc previous psychologist  suddenly at a young age and she is nervous to get connected to someone again.      Diagnosed in college. Was also told she had ADD but pt doesn't believe that, doesn't take any meds.      Flying phobia 2019    H/O cold sores     no hx of genital lesions    Mixed hyperlipidemia 2019    Myopia 2020    Psychiatric problem     depression    Subchorionic hemorrhage of placenta, antepartum 2024:  1.5 x 1.4 cm -- pelvic rest  24: VB/spotting yesterday, see phone note. Today brown spotting. US today reassuring +FHT. AVTAR measuring 2.2 x .5cm. Continue pelvic rest and SAB precautions  24: resolved         Past Surgical History:   Procedure Laterality Date    TONSILLECTOMY          Social History     Socioeconomic History    Marital status:      Spouse name: Not on file    Number of children:

## 2025-04-15 NOTE — ASSESSMENT & PLAN NOTE
D/W pt that Zoloft is most tested SSRI in PP, breastfeeding  She wishes to start - will F/U with PCP in 4-6 weeks

## 2025-04-15 NOTE — PROGRESS NOTES
Patient comes in today for 6 week post partum visit.     Patient would like to discuss anxiety meds.  [id number]     Delivery Method: Vaginal      Delivery Date: 03/05/2025     Birth Control: none     Breast/Bottle: both     Bleeding: none     Baby: Doing well     Bowel/Bladder: No issues     Blues: None     Last pap smear:  01/20/2021 negative, HPV negative     Vitals:    04/15/25 0943   BP: 118/66   BP Site: Right Upper Arm   Patient Position: Sitting   Weight: 88.5 kg (195 lb)   Height: 1.803 m (5' 11\")

## 2025-04-15 NOTE — PROGRESS NOTES
Chaperone for Intimate Exam     Chaperone was offer accepted as part of the rooming process    Chaperone: Emily ANGEL

## 2025-04-15 NOTE — PATIENT INSTRUCTIONS
Please take 1/2 of your zoloft for the first 4 days, then you can start taking a full tablet daily.  This will help avoid some of the side effects on your stomach. Please follow up with your regular doctor in 4-6 weeks  We will call you if anything is abnormal from your testing today.  Follow up as needed or next year for your yearly female exam.  Thanks for coming to see us today and letting us take care of you!

## 2025-04-22 ENCOUNTER — RESULTS FOLLOW-UP (OUTPATIENT)
Dept: OBGYN CLINIC | Age: 35
End: 2025-04-22

## 2025-04-22 LAB
COLLECTION METHOD: NORMAL
CYTOLOGIST CVX/VAG CYTO: NORMAL
CYTOLOGY CVX/VAG DOC THIN PREP: NORMAL
HPV APTIMA: NEGATIVE
HPV GENOTYPE REFLEX: NORMAL
Lab: NORMAL
PAP SOURCE: NORMAL
PATH REPORT.FINAL DX SPEC: NORMAL
STAT OF ADQ CVX/VAG CYTO-IMP: NORMAL

## 2025-06-19 DIAGNOSIS — Z86.59 HISTORY OF ANXIETY: Primary | ICD-10-CM
